# Patient Record
Sex: FEMALE | Race: WHITE | NOT HISPANIC OR LATINO | Employment: OTHER | URBAN - METROPOLITAN AREA
[De-identification: names, ages, dates, MRNs, and addresses within clinical notes are randomized per-mention and may not be internally consistent; named-entity substitution may affect disease eponyms.]

---

## 2023-03-09 ENCOUNTER — HOSPITAL ENCOUNTER (EMERGENCY)
Facility: HOSPITAL | Age: 88
Discharge: HOME | End: 2023-03-10
Attending: EMERGENCY MEDICINE

## 2023-03-09 DIAGNOSIS — N39.0 UTI (URINARY TRACT INFECTION): Primary | ICD-10-CM

## 2023-03-09 LAB
ALBUMIN SERPL BCP-MCNC: 3.6 G/DL (ref 3.5–5)
ALP SERPL-CCNC: 152 U/L (ref 34–104)
ALT SERPL W P-5'-P-CCNC: 6 U/L (ref 7–52)
ANION GAP SERPL CALCULATED.3IONS-SCNC: 15 MMOL/L (ref 4–13)
AST SERPL W P-5'-P-CCNC: 15 U/L (ref 13–39)
BACTERIA UR QL AUTO: ABNORMAL /HPF
BASOPHILS # BLD AUTO: 0.06 THOUSANDS/ÂΜL (ref 0–0.1)
BASOPHILS NFR BLD AUTO: 1 % (ref 0–1)
BILIRUB SERPL-MCNC: 0.92 MG/DL (ref 0.2–1)
BILIRUB UR QL STRIP: NEGATIVE
BUN SERPL-MCNC: 8 MG/DL (ref 5–25)
CALCIUM SERPL-MCNC: 8.7 MG/DL (ref 8.4–10.2)
CHLORIDE SERPL-SCNC: 102 MMOL/L (ref 96–108)
CLARITY UR: ABNORMAL
CO2 SERPL-SCNC: 19 MMOL/L (ref 21–32)
COLOR UR: ABNORMAL
CREAT SERPL-MCNC: 0.37 MG/DL (ref 0.6–1.3)
EOSINOPHIL # BLD AUTO: 0.04 THOUSAND/ÂΜL (ref 0–0.61)
EOSINOPHIL NFR BLD AUTO: 0 % (ref 0–6)
ERYTHROCYTE [DISTWIDTH] IN BLOOD BY AUTOMATED COUNT: 14.4 % (ref 11.6–15.1)
GFR SERPL CREATININE-BSD FRML MDRD: 88 ML/MIN/1.73SQ M
GLUCOSE SERPL-MCNC: 120 MG/DL (ref 65–140)
GLUCOSE SERPL-MCNC: 140 MG/DL (ref 65–140)
GLUCOSE UR STRIP-MCNC: NEGATIVE MG/DL
HCT VFR BLD AUTO: 39.4 % (ref 34.8–46.1)
HGB BLD-MCNC: 12.6 G/DL (ref 11.5–15.4)
HGB UR QL STRIP.AUTO: NEGATIVE
IMM GRANULOCYTES # BLD AUTO: 0.06 THOUSAND/UL (ref 0–0.2)
IMM GRANULOCYTES NFR BLD AUTO: 1 % (ref 0–2)
KETONES UR STRIP-MCNC: ABNORMAL MG/DL
LEUKOCYTE ESTERASE UR QL STRIP: ABNORMAL
LYMPHOCYTES # BLD AUTO: 0.97 THOUSANDS/ÂΜL (ref 0.6–4.47)
LYMPHOCYTES NFR BLD AUTO: 8 % (ref 14–44)
MCH RBC QN AUTO: 27.1 PG (ref 26.8–34.3)
MCHC RBC AUTO-ENTMCNC: 32 G/DL (ref 31.4–37.4)
MCV RBC AUTO: 85 FL (ref 82–98)
MONOCYTES # BLD AUTO: 0.7 THOUSAND/ÂΜL (ref 0.17–1.22)
MONOCYTES NFR BLD AUTO: 6 % (ref 4–12)
NEUTROPHILS # BLD AUTO: 10.32 THOUSANDS/ÂΜL (ref 1.85–7.62)
NEUTS SEG NFR BLD AUTO: 84 % (ref 43–75)
NITRITE UR QL STRIP: NEGATIVE
NON-SQ EPI CELLS URNS QL MICRO: ABNORMAL /HPF
NRBC BLD AUTO-RTO: 0 /100 WBCS
PH UR STRIP.AUTO: 7 [PH]
PLATELET # BLD AUTO: 571 THOUSANDS/UL (ref 149–390)
PMV BLD AUTO: 9.8 FL (ref 8.9–12.7)
POTASSIUM SERPL-SCNC: 3.3 MMOL/L (ref 3.5–5.3)
PROT SERPL-MCNC: 7.4 G/DL (ref 6.4–8.4)
PROT UR STRIP-MCNC: ABNORMAL MG/DL
RBC # BLD AUTO: 4.65 MILLION/UL (ref 3.81–5.12)
RBC #/AREA URNS AUTO: ABNORMAL /HPF
SODIUM SERPL-SCNC: 136 MMOL/L (ref 135–147)
SP GR UR STRIP.AUTO: 1.01 (ref 1–1.03)
UROBILINOGEN UR STRIP-ACNC: <2 MG/DL
WBC # BLD AUTO: 12.15 THOUSAND/UL (ref 4.31–10.16)
WBC #/AREA URNS AUTO: ABNORMAL /HPF

## 2023-03-09 RX ORDER — PROPYLTHIOURACIL 50 MG/1
50 TABLET ORAL 3 TIMES DAILY
COMMUNITY

## 2023-03-09 RX ORDER — QUINAPRIL 40 MG/1
40 TABLET ORAL
COMMUNITY

## 2023-03-09 RX ORDER — POTASSIUM CHLORIDE 20 MEQ/1
20 TABLET, EXTENDED RELEASE ORAL ONCE
Status: COMPLETED | OUTPATIENT
Start: 2023-03-09 | End: 2023-03-09

## 2023-03-09 RX ORDER — ATENOLOL 25 MG/1
25 TABLET ORAL DAILY
COMMUNITY

## 2023-03-09 RX ORDER — DIPYRIDAMOLE 25 MG
25 TABLET ORAL 4 TIMES DAILY
COMMUNITY

## 2023-03-09 RX ORDER — ATENOLOL 25 MG/1
25 TABLET ORAL DAILY
Status: COMPLETED | OUTPATIENT
Start: 2023-03-09 | End: 2023-03-09

## 2023-03-09 RX ORDER — OMEPRAZOLE 20 MG/1
20 CAPSULE, DELAYED RELEASE ORAL DAILY
COMMUNITY

## 2023-03-09 RX ORDER — CEPHALEXIN 250 MG/1
500 CAPSULE ORAL ONCE
Status: COMPLETED | OUTPATIENT
Start: 2023-03-09 | End: 2023-03-09

## 2023-03-09 RX ORDER — ATENOLOL 25 MG/1
25 TABLET ORAL DAILY
Status: DISCONTINUED | OUTPATIENT
Start: 2023-03-10 | End: 2023-03-09

## 2023-03-09 RX ADMIN — ATENOLOL 25 MG: 25 TABLET ORAL at 23:10

## 2023-03-09 RX ADMIN — POTASSIUM CHLORIDE 20 MEQ: 1500 TABLET, EXTENDED RELEASE ORAL at 23:10

## 2023-03-09 RX ADMIN — CEPHALEXIN 500 MG: 250 CAPSULE ORAL at 23:10

## 2023-03-10 VITALS
OXYGEN SATURATION: 96 % | RESPIRATION RATE: 12 BRPM | HEART RATE: 124 BPM | SYSTOLIC BLOOD PRESSURE: 130 MMHG | TEMPERATURE: 97.8 F | DIASTOLIC BLOOD PRESSURE: 82 MMHG

## 2023-03-10 LAB
ATRIAL RATE: 112 BPM
ATRIAL RATE: 141 BPM
P AXIS: 101 DEGREES
PR INTERVAL: 136 MS
QRS AXIS: -50 DEGREES
QRS AXIS: -51 DEGREES
QRSD INTERVAL: 72 MS
QRSD INTERVAL: 74 MS
QT INTERVAL: 306 MS
QT INTERVAL: 336 MS
QTC INTERVAL: 458 MS
QTC INTERVAL: 485 MS
T WAVE AXIS: -61 DEGREES
T WAVE AXIS: 68 DEGREES
VENTRICULAR RATE: 112 BPM
VENTRICULAR RATE: 151 BPM

## 2023-03-10 RX ORDER — CEPHALEXIN 500 MG/1
500 CAPSULE ORAL EVERY 8 HOURS SCHEDULED
Qty: 15 CAPSULE | Refills: 0 | Status: SHIPPED | OUTPATIENT
Start: 2023-03-10 | End: 2023-03-15

## 2023-03-10 NOTE — ED NOTES
Unable to get much information from patient  She has not gone to a hospital in about 35 years  According to daily medications it appears that she does have a cardiac history  Granddaughter is not able to give much information regarding patient, states that mother was the one helping patient, but since has been placed into a nursing facility  Patient lives at home alone and does everything on her own        Thomas Campbell RN  03/09/23 2019

## 2023-03-12 LAB
BACTERIA UR CULT: ABNORMAL

## 2023-03-12 NOTE — RESULT ENCOUNTER NOTE
Aerococcus >100 000 cfu - susceptible to cephalexin - treatment approrpriate  Ecoli [id number] treatment not indicated

## 2023-03-19 NOTE — ED PROVIDER NOTES
History  Chief Complaint   Patient presents with   • Possible UTI     Frequent urination starting today  Has noticed increased swelling in legs  Granddaughter states that she has not been drinking much today  5year-old female presenting to the emergency department for evaluation of urinary frequency  Patient notes that she has had frequency since earlier this morning, also noted some swelling to her bilateral legs, denies any shortness of breath  Denies any fever chills or flank pain  Denies abdominal pain nausea or vomiting  Prior to Admission Medications   Prescriptions Last Dose Informant Patient Reported? Taking?   atenolol (TENORMIN) 25 mg tablet   Yes Yes   Sig: Take 25 mg by mouth daily   dipyridamole (PERSANTINE) 25 mg tablet   Yes Yes   Sig: Take 25 mg by mouth 4 (four) times a day   omeprazole (PriLOSEC) 20 mg delayed release capsule   Yes Yes   Sig: Take 20 mg by mouth daily   propylthiouracil 50 mg tablet   Yes Yes   Sig: Take 50 mg by mouth 3 (three) times a day   quinapril (ACCUPRIL) 40 MG tablet   Yes Yes   Sig: Take 40 mg by mouth daily at bedtime      Facility-Administered Medications: None       Past Medical History:   Diagnosis Date   • Atrial fibrillation (HCC)    • GERD (gastroesophageal reflux disease)    • Heart attack [facility])     unknown when this happened   • Hypertension        History reviewed  No pertinent surgical history  History reviewed  No pertinent family history  I have reviewed and agree with the history as documented  E-Cigarette/Vaping   • E-Cigarette Use Never User      E-Cigarette/Vaping Substances     Social History     Tobacco Use   • Smoking status: Never   • Smokeless tobacco: Never   Vaping Use   • Vaping Use: Never used   Substance Use Topics   • Alcohol use: Never   • Drug use: Never       Review of Systems   Constitutional: Negative for appetite change, chills, fatigue and fever  HENT: Negative for sneezing and sore throat      Eyes: Negative for visual disturbance  Respiratory: Negative for cough, choking, chest tightness, shortness of breath and wheezing  Cardiovascular: Negative for chest pain and palpitations  Gastrointestinal: Negative for abdominal pain, constipation, diarrhea, nausea and vomiting  Genitourinary: Positive for frequency  Negative for difficulty urinating and dysuria  Neurological: Negative for dizziness, weakness, light-headedness, numbness and headaches  All other systems reviewed and are negative  Physical Exam  Physical Exam  Vitals reviewed  Constitutional:       General: She is not in acute distress  Appearance: She is well-developed  She is not diaphoretic  HENT:      Head: Normocephalic and atraumatic  Eyes:      Pupils: Pupils are equal, round, and reactive to light  Neck:      Vascular: No JVD  Trachea: No tracheal deviation  Cardiovascular:      Rate and Rhythm: Normal rate and regular rhythm  Heart sounds: Normal heart sounds  No murmur heard  No friction rub  No gallop  Pulmonary:      Effort: Pulmonary effort is normal  No respiratory distress  Breath sounds: Normal breath sounds  No wheezing or rales  Abdominal:      General: Bowel sounds are normal  There is no distension  Palpations: Abdomen is soft  Tenderness: There is no abdominal tenderness  There is no guarding or rebound  Skin:     General: Skin is warm and dry  Coloration: Skin is not pale  Neurological:      Mental Status: She is alert and oriented to person, place, and time  Cranial Nerves: No cranial nerve deficit  Motor: No abnormal muscle tone     Psychiatric:         Behavior: Behavior normal          Vital Signs  ED Triage Vitals [03/09/23 2010]   Temperature Pulse Respirations Blood Pressure SpO2   97 8 °F (36 6 °C) 98 16 (!) 206/96 96 %      Temp Source Heart Rate Source Patient Position - Orthostatic VS BP Location FiO2 (%)   Oral Monitor Sitting Right arm --      Pain Score       --           Vitals:    03/09/23 2300 03/10/23 0000 03/10/23 0030 03/10/23 0032   BP: 155/88 141/99 130/82    Pulse: (!) 158 (!) 135 (!) 125 (!) 124   Patient Position - Orthostatic VS:             Visual Acuity      ED Medications  Medications   cephalexin (KEFLEX) capsule 500 mg (500 mg Oral Given 3/9/23 2310)   potassium chloride (K-DUR,KLOR-CON) CR tablet 20 mEq (20 mEq Oral Given 3/9/23 2310)   atenolol (TENORMIN) tablet 25 mg (25 mg Oral Given 3/9/23 2310)       Diagnostic Studies  Results Reviewed     Procedure Component Value Units Date/Time    Urine culture [[id number]]  (Abnormal)  (Susceptibility) Collected: 03/09/23 2037    Lab Status: Final result Specimen: Urine, Other Updated: 03/12/23 0841     Urine Culture >100,000 cfu/ml Aerococcus urinae      10,000-19,000 cfu/ml Escherichia coli      <10,000 cfu/ml    Susceptibility     Aerococcus urinae (1)     Antibiotic Interpretation Microscan   Method Status    ZID Performed  Yes  DAHLIA Final          Escherichia coli (2)     Antibiotic Interpretation Microscan   Method Status    ZID Performed  Yes  DAHLIA Final    Amoxicillin + Clavulanate Resistant >16/8 ug/ml DAHLIA Final    Ampicillin ($$) Resistant >16 00 ug/ml DAHLIA Final    Ampicillin + Sulbactam ($) Intermediate 16/8 ug/ml DAHLIA Final    Aztreonam ($$$)  Susceptible <=4 ug/ml DAHLIA Final    Cefazolin ($) Resistant 16 00 ug/ml DAHLIA Final    Cefuroxime ($$) Susceptible 8 ug/ml DAHLIA Final    Ciprofloxacin ($)  Susceptible <=0 25 ug/ml DAHLIA Final    Ertapenem ($$$) Susceptible <=0 5 ug/ml DAHLIA Final    Gentamicin ($$) Susceptible <=2 ug/ml DAHLIA Final    Levofloxacin ($) Susceptible <=0 50 ug/ml DAHLIA Final    Nitrofurantoin Susceptible <=32 ug/ml DAHLIA Final    Piperacillin + Tazobactam ($$$) Susceptible <=8 ug/ml DAHLIA Final    Tetracycline Susceptible <=4 ug/ml DAHLIA Final    Tobramycin ($) Susceptible <=2 ug/ml DAHLIA Final    Trimethoprim + Sulfamethoxazole ($$$) Susceptible <=0 5/9 5 ug/ml DAHLIA Final Comprehensive metabolic panel [[id number]]  (Abnormal) Collected: 03/09/23 2155    Lab Status: Final result Specimen: Blood from Hand, Right Updated: 03/09/23 2232     Sodium 136 mmol/L      Potassium 3 3 mmol/L      Chloride 102 mmol/L      CO2 19 mmol/L      ANION GAP 15 mmol/L      BUN 8 mg/dL      Creatinine 0 37 mg/dL      Glucose 140 mg/dL      Calcium 8 7 mg/dL      AST 15 U/L      ALT 6 U/L      Alkaline Phosphatase 152 U/L      Total Protein 7 4 g/dL      Albumin 3 6 g/dL      Total Bilirubin 0 92 mg/dL      eGFR 88 ml/min/1 73sq m     Narrative:      [place] guidelines for Chronic Kidney Disease (CKD):   •  Stage 1 with normal or high GFR (GFR > 90 mL/min/1 73 square meters)  •  Stage 2 Mild CKD (GFR = 60-89 mL/min/1 73 square meters)  •  Stage 3A Moderate CKD (GFR = 45-59 mL/min/1 73 square meters)  •  Stage 3B Moderate CKD (GFR = 30-44 mL/min/1 73 square meters)  •  Stage 4 Severe CKD (GFR = 15-29 mL/min/1 73 square meters)  •  Stage 5 End Stage CKD (GFR <15 mL/min/1 73 square meters)  Note: GFR calculation is accurate only with a steady state creatinine    Urine Microscopic [[id number]]  (Abnormal) Collected: 03/09/23 2037    Lab Status: Final result Specimen: Urine, Other Updated: 03/09/23 2130     RBC, UA 2-4 /hpf      WBC, UA 30-50 /hpf      Epithelial Cells Occasional /hpf      Bacteria, UA Occasional /hpf     UA w Reflex to Microscopic w Reflex to Culture [[id number]]  (Abnormal) Collected: 03/09/23 2037    Lab Status: Final result Specimen: Urine, Other Updated: 03/09/23 2105     Color, UA Light Yellow     Clarity, UA Turbid     Specific Gravity, UA 1 006     pH, UA 7 0     Leukocytes, UA Small     Nitrite, UA Negative     Protein,  (2+) mg/dl      Glucose, UA Negative mg/dl      Ketones, UA 10 (1+) mg/dl      Urobilinogen, UA <2 0 mg/dl      Bilirubin, UA Negative     Occult Blood, UA Negative    CBC and differential [[id number]]  (Abnormal) Collected: 03/09/23 2038 Lab Status: Final result Specimen: Blood from Hand, Right Updated: 03/09/23 2046     WBC 12 15 Thousand/uL      RBC 4 65 Million/uL      Hemoglobin 12 6 g/dL      Hematocrit 39 4 %      MCV 85 fL      MCH 27 1 pg      MCHC 32 0 g/dL      RDW 14 4 %      MPV 9 8 fL      Platelets 014 Thousands/uL      nRBC 0 /100 WBCs      Neutrophils Relative 84 %      Immat GRANS % 1 %      Lymphocytes Relative 8 %      Monocytes Relative 6 %      Eosinophils Relative 0 %      Basophils Relative 1 %      Neutrophils Absolute 10 32 Thousands/µL      Immature Grans Absolute 0 06 Thousand/uL      Lymphocytes Absolute 0 97 Thousands/µL      Monocytes Absolute 0 70 Thousand/µL      Eosinophils Absolute 0 04 Thousand/µL      Basophils Absolute 0 06 Thousands/µL     Fingerstick Glucose (POCT) [[id number]]  (Normal) Collected: 03/09/23 2014    Lab Status: Final result Updated: 03/09/23 2015     POC Glucose 120 mg/dl                  No orders to display              Procedures  Procedures         ED Course                                             Medical Decision Making  66-year-old female with concern for urinary tract infection, has some leg edema here but no shortness of breath  Patient states this is about her baseline  No signs or symptoms of obstructive uropathy here, will check urinalysis, labs, reassess  UA does have some bacteria in it, with her symptoms we will treat for urinary tract infection, blood work overall okay, likely candidate for trial of outpatient management is overall well-appearing, recommend increase p o  intake  She is tolerating p o  here without difficulty, reviewed return precautions  Amount and/or Complexity of Data Reviewed  Labs: ordered  Risk  Prescription drug management            Disposition  Final diagnoses:   UTI (urinary tract infection)     Time reflects when diagnosis was documented in both MDM as applicable and the Disposition within this note     Time User Action Codes Description Comment    3/10/2023 12:33 AM Gracy Poon Add [N39 0] UTI (urinary tract infection)       ED Disposition     ED Disposition   Discharge    Condition   Stable    Date/Time   Fri Mar 10, 2023 12:33 AM    Comment   Ciera Esquivel discharge to home/self care  Follow-up Information     Follow up With Specialties Details Why Contact Info    Jackelyn Kelley DO General Practice   [address]  [address]  [address] [address] [phone]            Discharge Medication List as of 3/10/2023 12:34 AM      START taking these medications    Details   cephalexin (KEFLEX) 500 mg capsule Take 1 capsule (500 mg total) by mouth every 8 (eight) hours for 5 days, Starting Fri 3/10/2023, Until Wed 3/15/2023, Normal         CONTINUE these medications which have NOT CHANGED    Details   atenolol (TENORMIN) 25 mg tablet Take 25 mg by mouth daily, Historical Med      dipyridamole (PERSANTINE) 25 mg tablet Take 25 mg by mouth 4 (four) times a day, Historical Med      omeprazole (PriLOSEC) 20 mg delayed release capsule Take 20 mg by mouth daily, Historical Med      propylthiouracil 50 mg tablet Take 50 mg by mouth 3 (three) times a day, Historical Med      quinapril (ACCUPRIL) 40 MG tablet Take 40 mg by mouth daily at bedtime, Historical Med             No discharge procedures on file      PDMP Review     None          ED Provider  Electronically Signed by           Barby Quinn MD  03/19/23 0973

## 2023-07-10 ENCOUNTER — APPOINTMENT (EMERGENCY)
Dept: RADIOLOGY | Facility: HOSPITAL | Age: 88
DRG: 186 | End: 2023-07-10
Payer: MEDICARE

## 2023-07-10 ENCOUNTER — HOSPITAL ENCOUNTER (INPATIENT)
Facility: HOSPITAL | Age: 88
LOS: 4 days | Discharge: NURSING, REHAB OR LONG-TERM CARE | DRG: 186 | End: 2023-07-14
Attending: EMERGENCY MEDICINE | Admitting: INTERNAL MEDICINE
Payer: MEDICARE

## 2023-07-10 DIAGNOSIS — R53.1 GENERALIZED WEAKNESS: ICD-10-CM

## 2023-07-10 DIAGNOSIS — J90 PLEURAL EFFUSION: ICD-10-CM

## 2023-07-10 DIAGNOSIS — I50.9 CHF (CONGESTIVE HEART FAILURE) (HCC): ICD-10-CM

## 2023-07-10 DIAGNOSIS — N30.00 ACUTE CYSTITIS WITHOUT HEMATURIA: ICD-10-CM

## 2023-07-10 DIAGNOSIS — N39.0 UTI (URINARY TRACT INFECTION): Primary | ICD-10-CM

## 2023-07-10 DIAGNOSIS — I48.0 PAROXYSMAL ATRIAL FIBRILLATION (HCC): ICD-10-CM

## 2023-07-10 PROBLEM — R26.2 AMBULATORY DYSFUNCTION: Status: ACTIVE | Noted: 2023-07-10

## 2023-07-10 PROBLEM — I10 ESSENTIAL HYPERTENSION: Status: ACTIVE | Noted: 2023-07-10

## 2023-07-10 PROBLEM — J96.01 ACUTE RESPIRATORY FAILURE WITH HYPOXIA (HCC): Status: ACTIVE | Noted: 2023-07-10

## 2023-07-10 PROBLEM — A41.9 SEPSIS (HCC): Status: ACTIVE | Noted: 2023-07-10

## 2023-07-10 LAB
2HR DELTA HS TROPONIN: 19 NG/L
ALBUMIN SERPL BCP-MCNC: 3.1 G/DL (ref 3.5–5)
ALP SERPL-CCNC: 129 U/L (ref 34–104)
ALT SERPL W P-5'-P-CCNC: 9 U/L (ref 7–52)
ANION GAP SERPL CALCULATED.3IONS-SCNC: 10 MMOL/L
AST SERPL W P-5'-P-CCNC: 12 U/L (ref 13–39)
BACTERIA UR QL AUTO: ABNORMAL /HPF
BASOPHILS # BLD AUTO: 0.02 THOUSANDS/ÂΜL (ref 0–0.1)
BASOPHILS NFR BLD AUTO: 0 % (ref 0–1)
BILIRUB SERPL-MCNC: 0.98 MG/DL (ref 0.2–1)
BILIRUB UR QL STRIP: NEGATIVE
BNP SERPL-MCNC: 889 PG/ML (ref 0–100)
BUDDING YEAST: PRESENT
BUN SERPL-MCNC: 17 MG/DL (ref 5–25)
CALCIUM ALBUM COR SERPL-MCNC: 8.7 MG/DL (ref 8.3–10.1)
CALCIUM SERPL-MCNC: 8 MG/DL (ref 8.4–10.2)
CARDIAC TROPONIN I PNL SERPL HS: 52 NG/L
CARDIAC TROPONIN I PNL SERPL HS: 71 NG/L
CHLORIDE SERPL-SCNC: 109 MMOL/L (ref 96–108)
CK SERPL-CCNC: 29 U/L (ref 26–192)
CLARITY UR: ABNORMAL
CO2 SERPL-SCNC: 24 MMOL/L (ref 21–32)
COLOR UR: YELLOW
CREAT SERPL-MCNC: 0.66 MG/DL (ref 0.6–1.3)
EOSINOPHIL # BLD AUTO: 0.04 THOUSAND/ÂΜL (ref 0–0.61)
EOSINOPHIL NFR BLD AUTO: 0 % (ref 0–6)
ERYTHROCYTE [DISTWIDTH] IN BLOOD BY AUTOMATED COUNT: 15.6 % (ref 11.6–15.1)
FLUAV RNA RESP QL NAA+PROBE: NEGATIVE
FLUBV RNA RESP QL NAA+PROBE: NEGATIVE
GFR SERPL CREATININE-BSD FRML MDRD: 73 ML/MIN/1.73SQ M
GLUCOSE SERPL-MCNC: 108 MG/DL (ref 65–140)
GLUCOSE SERPL-MCNC: 110 MG/DL (ref 65–140)
GLUCOSE UR STRIP-MCNC: NEGATIVE MG/DL
HCT VFR BLD AUTO: 38.8 % (ref 34.8–46.1)
HGB BLD-MCNC: 12.5 G/DL (ref 11.5–15.4)
HGB UR QL STRIP.AUTO: ABNORMAL
IMM GRANULOCYTES # BLD AUTO: 0.07 THOUSAND/UL (ref 0–0.2)
IMM GRANULOCYTES NFR BLD AUTO: 1 % (ref 0–2)
KETONES UR STRIP-MCNC: NEGATIVE MG/DL
LACTATE SERPL-SCNC: 1.3 MMOL/L (ref 0.5–2)
LEUKOCYTE ESTERASE UR QL STRIP: ABNORMAL
LYMPHOCYTES # BLD AUTO: 0.66 THOUSANDS/ÂΜL (ref 0.6–4.47)
LYMPHOCYTES NFR BLD AUTO: 6 % (ref 14–44)
MAGNESIUM SERPL-MCNC: 1.7 MG/DL (ref 1.9–2.7)
MCH RBC QN AUTO: 28.1 PG (ref 26.8–34.3)
MCHC RBC AUTO-ENTMCNC: 32.2 G/DL (ref 31.4–37.4)
MCV RBC AUTO: 87 FL (ref 82–98)
MONOCYTES # BLD AUTO: 0.55 THOUSAND/ÂΜL (ref 0.17–1.22)
MONOCYTES NFR BLD AUTO: 5 % (ref 4–12)
MUCOUS THREADS UR QL AUTO: ABNORMAL
NEUTROPHILS # BLD AUTO: 10.34 THOUSANDS/ÂΜL (ref 1.85–7.62)
NEUTS SEG NFR BLD AUTO: 88 % (ref 43–75)
NITRITE UR QL STRIP: NEGATIVE
NON-SQ EPI CELLS URNS QL MICRO: ABNORMAL /HPF
NRBC BLD AUTO-RTO: 0 /100 WBCS
PH UR STRIP.AUTO: 6 [PH]
PLATELET # BLD AUTO: 451 THOUSANDS/UL (ref 149–390)
PMV BLD AUTO: 9.8 FL (ref 8.9–12.7)
POTASSIUM SERPL-SCNC: 2.8 MMOL/L (ref 3.5–5.3)
POTASSIUM SERPL-SCNC: 3.4 MMOL/L (ref 3.5–5.3)
PROT SERPL-MCNC: 7 G/DL (ref 6.4–8.4)
PROT UR STRIP-MCNC: ABNORMAL MG/DL
RBC # BLD AUTO: 4.45 MILLION/UL (ref 3.81–5.12)
RBC #/AREA URNS AUTO: ABNORMAL /HPF
RSV RNA RESP QL NAA+PROBE: NEGATIVE
SARS-COV-2 RNA RESP QL NAA+PROBE: NEGATIVE
SODIUM SERPL-SCNC: 143 MMOL/L (ref 135–147)
SP GR UR STRIP.AUTO: 1.02 (ref 1–1.03)
TSH SERPL DL<=0.05 MIU/L-ACNC: 4.12 UIU/ML (ref 0.45–4.5)
UROBILINOGEN UR STRIP-ACNC: 3 MG/DL
WBC # BLD AUTO: 11.68 THOUSAND/UL (ref 4.31–10.16)
WBC #/AREA URNS AUTO: ABNORMAL /HPF
WBC CLUMPS # UR AUTO: PRESENT /UL

## 2023-07-10 PROCEDURE — 99285 EMERGENCY DEPT VISIT HI MDM: CPT | Performed by: PHYSICIAN ASSISTANT

## 2023-07-10 PROCEDURE — 99223 1ST HOSP IP/OBS HIGH 75: CPT | Performed by: INTERNAL MEDICINE

## 2023-07-10 PROCEDURE — 84132 ASSAY OF SERUM POTASSIUM: CPT | Performed by: INTERNAL MEDICINE

## 2023-07-10 PROCEDURE — 82550 ASSAY OF CK (CPK): CPT | Performed by: PHYSICIAN ASSISTANT

## 2023-07-10 PROCEDURE — 85025 COMPLETE CBC W/AUTO DIFF WBC: CPT | Performed by: PHYSICIAN ASSISTANT

## 2023-07-10 PROCEDURE — 81001 URINALYSIS AUTO W/SCOPE: CPT | Performed by: PHYSICIAN ASSISTANT

## 2023-07-10 PROCEDURE — 83880 ASSAY OF NATRIURETIC PEPTIDE: CPT | Performed by: PHYSICIAN ASSISTANT

## 2023-07-10 PROCEDURE — 71045 X-RAY EXAM CHEST 1 VIEW: CPT

## 2023-07-10 PROCEDURE — 80053 COMPREHEN METABOLIC PANEL: CPT | Performed by: PHYSICIAN ASSISTANT

## 2023-07-10 PROCEDURE — 96366 THER/PROPH/DIAG IV INF ADDON: CPT

## 2023-07-10 PROCEDURE — 99497 ADVNCD CARE PLAN 30 MIN: CPT | Performed by: INTERNAL MEDICINE

## 2023-07-10 PROCEDURE — 0241U HB NFCT DS VIR RESP RNA 4 TRGT: CPT | Performed by: PHYSICIAN ASSISTANT

## 2023-07-10 PROCEDURE — 87040 BLOOD CULTURE FOR BACTERIA: CPT | Performed by: PHYSICIAN ASSISTANT

## 2023-07-10 PROCEDURE — 96368 THER/DIAG CONCURRENT INF: CPT

## 2023-07-10 PROCEDURE — 83605 ASSAY OF LACTIC ACID: CPT | Performed by: PHYSICIAN ASSISTANT

## 2023-07-10 PROCEDURE — 84443 ASSAY THYROID STIM HORMONE: CPT | Performed by: INTERNAL MEDICINE

## 2023-07-10 PROCEDURE — 87086 URINE CULTURE/COLONY COUNT: CPT | Performed by: PHYSICIAN ASSISTANT

## 2023-07-10 PROCEDURE — 87186 SC STD MICRODIL/AGAR DIL: CPT | Performed by: PHYSICIAN ASSISTANT

## 2023-07-10 PROCEDURE — 93005 ELECTROCARDIOGRAM TRACING: CPT

## 2023-07-10 PROCEDURE — 96375 TX/PRO/DX INJ NEW DRUG ADDON: CPT

## 2023-07-10 PROCEDURE — 96361 HYDRATE IV INFUSION ADD-ON: CPT

## 2023-07-10 PROCEDURE — 82948 REAGENT STRIP/BLOOD GLUCOSE: CPT

## 2023-07-10 PROCEDURE — 84484 ASSAY OF TROPONIN QUANT: CPT | Performed by: PHYSICIAN ASSISTANT

## 2023-07-10 PROCEDURE — 99284 EMERGENCY DEPT VISIT MOD MDM: CPT

## 2023-07-10 PROCEDURE — 36415 COLL VENOUS BLD VENIPUNCTURE: CPT | Performed by: PHYSICIAN ASSISTANT

## 2023-07-10 PROCEDURE — 83735 ASSAY OF MAGNESIUM: CPT | Performed by: PHYSICIAN ASSISTANT

## 2023-07-10 PROCEDURE — 87077 CULTURE AEROBIC IDENTIFY: CPT | Performed by: PHYSICIAN ASSISTANT

## 2023-07-10 PROCEDURE — 96365 THER/PROPH/DIAG IV INF INIT: CPT

## 2023-07-10 RX ORDER — HYDRALAZINE HYDROCHLORIDE 20 MG/ML
10 INJECTION INTRAMUSCULAR; INTRAVENOUS EVERY 6 HOURS PRN
Status: DISCONTINUED | OUTPATIENT
Start: 2023-07-10 | End: 2023-07-14 | Stop reason: HOSPADM

## 2023-07-10 RX ORDER — PANTOPRAZOLE SODIUM 40 MG/1
40 TABLET, DELAYED RELEASE ORAL
Status: DISCONTINUED | OUTPATIENT
Start: 2023-07-11 | End: 2023-07-14 | Stop reason: HOSPADM

## 2023-07-10 RX ORDER — ATENOLOL 25 MG/1
25 TABLET ORAL DAILY
Status: DISCONTINUED | OUTPATIENT
Start: 2023-07-10 | End: 2023-07-10

## 2023-07-10 RX ORDER — LABETALOL HYDROCHLORIDE 5 MG/ML
10 INJECTION, SOLUTION INTRAVENOUS EVERY 6 HOURS PRN
Status: DISCONTINUED | OUTPATIENT
Start: 2023-07-10 | End: 2023-07-14 | Stop reason: HOSPADM

## 2023-07-10 RX ORDER — LISINOPRIL 20 MG/1
40 TABLET ORAL DAILY
Status: DISCONTINUED | OUTPATIENT
Start: 2023-07-11 | End: 2023-07-11

## 2023-07-10 RX ORDER — FUROSEMIDE 10 MG/ML
40 INJECTION INTRAMUSCULAR; INTRAVENOUS
Status: DISCONTINUED | OUTPATIENT
Start: 2023-07-11 | End: 2023-07-14 | Stop reason: HOSPADM

## 2023-07-10 RX ORDER — DILTIAZEM HYDROCHLORIDE 5 MG/ML
0.25 INJECTION INTRAVENOUS ONCE
Status: COMPLETED | OUTPATIENT
Start: 2023-07-10 | End: 2023-07-10

## 2023-07-10 RX ORDER — CEFTRIAXONE 1 G/50ML
1000 INJECTION, SOLUTION INTRAVENOUS ONCE
Status: COMPLETED | OUTPATIENT
Start: 2023-07-10 | End: 2023-07-10

## 2023-07-10 RX ORDER — LISINOPRIL 20 MG/1
40 TABLET ORAL ONCE
Status: COMPLETED | OUTPATIENT
Start: 2023-07-10 | End: 2023-07-10

## 2023-07-10 RX ORDER — ATENOLOL 25 MG/1
25 TABLET ORAL DAILY
Status: DISCONTINUED | OUTPATIENT
Start: 2023-07-11 | End: 2023-07-12

## 2023-07-10 RX ORDER — PROPYLTHIOURACIL 50 MG/1
50 TABLET ORAL 3 TIMES DAILY
Status: DISCONTINUED | OUTPATIENT
Start: 2023-07-10 | End: 2023-07-14 | Stop reason: HOSPADM

## 2023-07-10 RX ORDER — ONDANSETRON 2 MG/ML
4 INJECTION INTRAMUSCULAR; INTRAVENOUS EVERY 6 HOURS PRN
Status: DISCONTINUED | OUTPATIENT
Start: 2023-07-10 | End: 2023-07-14 | Stop reason: HOSPADM

## 2023-07-10 RX ORDER — MAGNESIUM SULFATE HEPTAHYDRATE 40 MG/ML
2 INJECTION, SOLUTION INTRAVENOUS ONCE
Status: COMPLETED | OUTPATIENT
Start: 2023-07-10 | End: 2023-07-10

## 2023-07-10 RX ORDER — METOPROLOL TARTRATE 5 MG/5ML
5 INJECTION INTRAVENOUS
Status: DISCONTINUED | OUTPATIENT
Start: 2023-07-10 | End: 2023-07-14 | Stop reason: HOSPADM

## 2023-07-10 RX ORDER — POTASSIUM CHLORIDE 20 MEQ/1
40 TABLET, EXTENDED RELEASE ORAL ONCE
Status: COMPLETED | OUTPATIENT
Start: 2023-07-10 | End: 2023-07-10

## 2023-07-10 RX ORDER — POTASSIUM CHLORIDE 14.9 MG/ML
20 INJECTION INTRAVENOUS ONCE
Status: COMPLETED | OUTPATIENT
Start: 2023-07-10 | End: 2023-07-10

## 2023-07-10 RX ORDER — DIPYRIDAMOLE 25 MG
25 TABLET ORAL 4 TIMES DAILY
Status: DISCONTINUED | OUTPATIENT
Start: 2023-07-10 | End: 2023-07-12

## 2023-07-10 RX ORDER — FUROSEMIDE 40 MG/1
40 TABLET ORAL 2 TIMES DAILY
Status: DISCONTINUED | OUTPATIENT
Start: 2023-07-10 | End: 2023-07-10

## 2023-07-10 RX ORDER — ENOXAPARIN SODIUM 100 MG/ML
40 INJECTION SUBCUTANEOUS DAILY
Status: DISCONTINUED | OUTPATIENT
Start: 2023-07-11 | End: 2023-07-11

## 2023-07-10 RX ORDER — ACETAMINOPHEN 325 MG/1
650 TABLET ORAL EVERY 6 HOURS PRN
Status: DISCONTINUED | OUTPATIENT
Start: 2023-07-10 | End: 2023-07-14 | Stop reason: HOSPADM

## 2023-07-10 RX ORDER — POTASSIUM CHLORIDE 20 MEQ/1
40 TABLET, EXTENDED RELEASE ORAL
Status: COMPLETED | OUTPATIENT
Start: 2023-07-10 | End: 2023-07-10

## 2023-07-10 RX ORDER — FUROSEMIDE 10 MG/ML
40 INJECTION INTRAMUSCULAR; INTRAVENOUS ONCE
Status: COMPLETED | OUTPATIENT
Start: 2023-07-10 | End: 2023-07-10

## 2023-07-10 RX ORDER — CEFTRIAXONE 1 G/50ML
1000 INJECTION, SOLUTION INTRAVENOUS EVERY 24 HOURS
Status: DISCONTINUED | OUTPATIENT
Start: 2023-07-11 | End: 2023-07-14 | Stop reason: HOSPADM

## 2023-07-10 RX ADMIN — PROPYLTHIOURACIL 50 MG: 50 TABLET ORAL at 22:51

## 2023-07-10 RX ADMIN — FUROSEMIDE 40 MG: 10 INJECTION, SOLUTION INTRAVENOUS at 15:50

## 2023-07-10 RX ADMIN — POTASSIUM CHLORIDE 40 MEQ: 1500 TABLET, EXTENDED RELEASE ORAL at 21:45

## 2023-07-10 RX ADMIN — LISINOPRIL 40 MG: 20 TABLET ORAL at 15:51

## 2023-07-10 RX ADMIN — POTASSIUM CHLORIDE 40 MEQ: 1500 TABLET, EXTENDED RELEASE ORAL at 15:29

## 2023-07-10 RX ADMIN — DILTIAZEM HYDROCHLORIDE 12.5 MG: 5 INJECTION INTRAVENOUS at 16:27

## 2023-07-10 RX ADMIN — POTASSIUM CHLORIDE 40 MEQ: 1500 TABLET, EXTENDED RELEASE ORAL at 19:50

## 2023-07-10 RX ADMIN — POTASSIUM CHLORIDE 20 MEQ: 14.9 INJECTION, SOLUTION INTRAVENOUS at 15:21

## 2023-07-10 RX ADMIN — METOROPROLOL TARTRATE 5 MG: 5 INJECTION, SOLUTION INTRAVENOUS at 23:24

## 2023-07-10 RX ADMIN — CEFTRIAXONE 1000 MG: 1 INJECTION, SOLUTION INTRAVENOUS at 16:32

## 2023-07-10 RX ADMIN — MAGNESIUM SULFATE HEPTAHYDRATE 2 G: 40 INJECTION, SOLUTION INTRAVENOUS at 15:21

## 2023-07-10 RX ADMIN — POTASSIUM CHLORIDE 40 MEQ: 1500 TABLET, EXTENDED RELEASE ORAL at 23:15

## 2023-07-10 RX ADMIN — SODIUM CHLORIDE 1000 ML: 0.9 INJECTION, SOLUTION INTRAVENOUS at 14:42

## 2023-07-10 RX ADMIN — ATENOLOL 25 MG: 25 TABLET ORAL at 15:51

## 2023-07-10 NOTE — H&P
[address]  H&P  Name: Manuela Mccauley 80 y.o. female I MRN: [id number]  Unit/Bed#: -01 I Date of Admission: 7/10/2023   Date of Service: 7/10/2023 I Hospital Day: 0      Assessment/Plan   * Acute respiratory failure with hypoxia [facility])  Assessment & Plan  Presenting with worsening SOB and increasing O2 needs now needing 2L    At baseline, uses no supplemental O2    Plan:  • Treat underlying pleural effusion  • Wean O2 as able      Generalized weakness  Assessment & Plan  Generalized weakness and malaise for the past 2 days  May be attributed to UTI  UA showing innumerable WBCs and bacteria; received 1 dose of ceftriaxone in the ED    Continue antibiotic regimen  Follow urine culture and sensitivities      Ambulatory dysfunction  Assessment & Plan  • PT/OT Eval and treat  AM-PAC Basic Mobility:  Basic Mobility Inpatient Raw Score: 13    -[facility] Achieved: 3: Sit at edge of bed  -[facility] Goal: 4: Move to chair/commode  • Encourage appropriate mobility to achieve and improve upon [facility] Goals as noted    CM input appreciated - Patient lives at home alone; patient and family would like for patient to go to SNF as she would not be a safe discharge home - Preferred [facility] due to family member also there    Pleural effusion on right  Assessment & Plan  Patient presenting with symptoms of SOB and lethargic    · Rales present  · Imaging showed  · This is suspected to be secondary to  but cannot be definitively diagnosed without fluid studies    Plan:  · Lasix diuresis  · Maintain K > 4 and magnesium > 2  · Monitor on Tele  · Monitor I/O's  · Diet restriction fluid of 1800 mL per day   · Patient would benefit from diagnostic and therapeutic thoracentesis;  Deferring procedure to be done; opting for more conservative interention    Essential hypertension  Assessment & Plan  Blood Pressure: 112/76      Plan:  • Continue home meds  • Monitor blood pressure  • PRN BP Meds ordered for SBP > 160    Paroxysmal atrial fibrillation (HCC)  Assessment & Plan  Parox atrial fibrillation on cardizem and atenolol on no AC due to high risk of falls  HR 110s on admission, likely worsened by UTI and pleural effusion    Plan:  • Continue home meds  • Monitor rates/BP  • PRN Lopressor 5 mg IV q5 min for 5 doses for RVR       VTE Pharmacologic Prophylaxis: VTE Score: 6 High Risk (Score >/= 5) - Pharmacological DVT Prophylaxis Ordered: enoxaparin (Lovenox). Sequential Compression Devices Ordered. Code Status: Level 3 - DNAR and DNI   Discussion with Patient/Family: patient and granddaughter (POA)    Anticipated Length of Stay: Patient will be admitted on an inpatient basis with an anticipated length of stay of greater than 2 midnights secondary to plan noted. Total Time for Visit, including Counseling / Coordination of Care: 85 minutes Greater than 50% of this total time spent on direct patient counseling and coordination of care. Chief Complaint:   Chief Complaint   Patient presents with   • Failure To Thrive     Pt arrives with EMS from home, family reports decreased oral intake and ambulation. Pt family reports that family doctor saw her last week and these symptoms were not present. History of Present Illness:  Brody Lainez is a 80 y.o. female who presents with generalized weakness and malaise for the past 2 days    PMHx paroxysmal atrial fibrillation, HTN, GERD    Presented to the ED due to worsening weakness and also noted to have desaturation to mid 80s on room air. Chest x-ray was obtained at that time showing significant right-sided pleural effusion. Patient does not have a history of this in the past.    In the ED, patient reportedly appeared clinically dry and received a liter bolus of fluids. However after x-ray was obtained, patient received diuretic regimen.   Also noted to be hypokalemic and hypomagnesemic  Initially was offered the option for thoracentesis but patient and granddaughter declined, opting for more medical management and less invasive maneuvers for treatment. Per patient and family, patient's  underwent similar procedures in the past and they feel that pursuing such aggressive interventions would not improve quality of life. Of note, UA also appeared to have innumerable WBCs and Bacteria and received ceftriaxone in the ED. When asked about urine symptoms, patient's grand daughter reported patient had foul smelling urine over the past few days, dark in color. No fevers, nausea, chills, confusion noted. Patient and family preferring for patient to be discharged to facility, ideally [facility], once cleared for discharge. GOC was also discussed with patient and granddaughter. Admitted for treatment of UTI and diuresis as conservative management of pleural effusion    Review of Systems:  A 10-point review of systems was obtained. Pertinent positives and negatives are outlined in the HPI above. Remainder of review of systems are otherwise negative. Past Medical and Surgical History:   Past Medical History:   Diagnosis Date   • Atrial fibrillation ([address])    • GERD (gastroesophageal reflux disease)    • Heart attack [facility])     unknown when this happened   • Hypertension        History reviewed. No pertinent surgical history. Meds/Allergies:  Prior to Admission medications    Medication Sig Start Date End Date Taking?  Authorizing Provider   atenolol (TENORMIN) 25 mg tablet Take 25 mg by mouth daily    Historical Provider, MD   dipyridamole (PERSANTINE) 25 mg tablet Take 25 mg by mouth 4 (four) times a day    Historical Provider, MD   omeprazole (PriLOSEC) 20 mg delayed release capsule Take 20 mg by mouth daily    Historical Provider, MD   propylthiouracil 50 mg tablet Take 50 mg by mouth 3 (three) times a day    Historical Provider, MD   quinapril (ACCUPRIL) 40 MG tablet Take 40 mg by mouth daily at bedtime    Historical Provider, MD MORFIN have reviewed home medications with patient family member. Allergies: No Known Allergies    Social History:  Marital Status:    Patient Pre-hospital Living Situation: Home  Patient Pre-hospital Level of Mobility: walks with cane  Patient Pre-hospital Diet Restrictions: none  Substance Use History:   Social History     Substance and Sexual Activity   Alcohol Use Never     Social History     Tobacco Use   Smoking Status Never   Smokeless Tobacco Never     Social History     Substance and Sexual Activity   Drug Use Never       Family History:  History reviewed. No pertinent family history. Physical Exam:     Vitals:   Blood Pressure: 112/76 (07/10/23 1858)  Pulse: (!) 121 (07/10/23 1858)  Temperature: 98.6 °F (37 °C) (07/10/23 1858)  Temp Source: Oral (07/10/23 1424)  Respirations: 12 (07/10/23 1800)  Height: 5' 2" (157.5 cm) (07/10/23 2012)  Weight - Scale: 50.1 kg (110 lb 7.2 oz) (07/10/23 1940)  SpO2: 97 % (07/10/23 1858)    Physical Exam  Vitals reviewed. Constitutional:       General: She is not in acute distress. Appearance: She is ill-appearing. She is not toxic-appearing. HENT:      Head: Normocephalic. Nose: Nose normal.      Mouth/Throat:      Mouth: Mucous membranes are dry. Eyes:      General: No scleral icterus. Conjunctiva/sclera: Conjunctivae normal.   Cardiovascular:      Rate and Rhythm: Normal rate. Pulses: Normal pulses. Radial pulses are 2+ on the right side and 2+ on the left side. Heart sounds: Normal heart sounds. Pulmonary:      Effort: Pulmonary effort is normal.      Breath sounds: Normal breath sounds. Abdominal:      General: Bowel sounds are normal. There is no distension. Palpations: Abdomen is soft. Tenderness: There is no abdominal tenderness. Musculoskeletal:         General: No tenderness. Skin:     General: Skin is dry. Coloration: Skin is not jaundiced. Neurological:      Mental Status: She is alert and oriented to person, place, and time.  Mental status is at baseline. Motor: Weakness present. Psychiatric:         Mood and Affect: Mood normal.         Behavior: Behavior normal. Behavior is cooperative. Additional Data:     Lab Results:  Results from last 7 days   Lab Units 07/10/23  1442   WBC Thousand/uL 11.68*   HEMOGLOBIN g/dL 12.5   HEMATOCRIT % 38.8   PLATELETS Thousands/uL 451*   NEUTROS PCT % 88*   LYMPHS PCT % 6*   MONOS PCT % 5   EOS PCT % 0     Results from last 7 days   Lab Units 07/10/23  1937 07/10/23  1442   SODIUM mmol/L  --  143   POTASSIUM mmol/L 3.4* 2.8*   CHLORIDE mmol/L  --  109*   CO2 mmol/L  --  24   BUN mg/dL  --  17   CREATININE mg/dL  --  0.66   ANION GAP mmol/L  --  10   CALCIUM mg/dL  --  8.0*   ALBUMIN g/dL  --  3.1*   TOTAL BILIRUBIN mg/dL  --  0.98   ALK PHOS U/L  --  129*   ALT U/L  --  9   AST U/L  --  12*   GLUCOSE RANDOM mg/dL  --  108         Results from last 7 days   Lab Units 07/10/23  1428   POC GLUCOSE mg/dl 110         Results from last 7 days   Lab Units 07/10/23  1442   LACTIC ACID mmol/L 1.3       Imaging Results Reviewed as noted below  XR chest 1 view portable   ED Interpretation by Alexandra Arroyo PA-C (07/10 3624)   Right pleural effusion, atelectasis vs infiltrate RLL            No results found. No Chest XR results available for this patient. EKG and Other Studies Reviewed on Admission:   · EKG: Afib with RVR    Recent Labs     07/10/23  1437   VENTRATE 138         ** Please Note: This note has been constructed using a voice recognition system.  **

## 2023-07-10 NOTE — ASSESSMENT & PLAN NOTE
Patient presenting with symptoms of SOB and lethargic    · Rales present  · Imaging showed  · This is suspected to be secondary to  but cannot be definitively diagnosed without fluid studies    Plan:  · Lasix diuresis  · Maintain K > 4 and magnesium > 2  · Monitor on Tele  · Monitor I/O's  · Diet restriction fluid of 1800 mL per day   · Patient would benefit from diagnostic and therapeutic thoracentesis;  Deferring procedure to be done; opting for more conservative interention

## 2023-07-10 NOTE — ASSESSMENT & PLAN NOTE
Presenting with worsening SOB and increasing O2 needs now needing 2L    At baseline, uses no supplemental O2    Plan:  • Treat underlying pleural effusion  • Wean O2 as able

## 2023-07-10 NOTE — ASSESSMENT & PLAN NOTE
Generalized weakness and malaise for the past 2 days  May be attributed to UTI  UA showing innumerable WBCs and bacteria; received 1 dose of ceftriaxone in the ED    Continue antibiotic regimen  Follow urine culture and sensitivities

## 2023-07-10 NOTE — ED PROVIDER NOTES
History  Chief Complaint   Patient presents with   • Failure To Thrive     Pt arrives with EMS from home, family reports decreased oral intake and ambulation. Pt family reports that family doctor saw her last week and these symptoms were not present. This is a 80-year-old female patient presenting to the emergency room for malaise and decreased appetite since Sunday. Granddaughter is present to help supplement history. She is a primary caregiver. Apparently last week she saw a family doctor and at that time was amatory acting herself eating and drinking per usual.  Was apparently doing well at that appointment. Yesterday she "became dead weight". Asking to go to God. She has been laying in bed since that time. She denies any chest pain abdominal pain headache nausea or vomiting. She has no extremity weakness. Per daughter her speech is baseline, she has no confusion and no change in mental status. She has not had any fevers chills cough or congestion. History provided by:  Patient (Granddaughter)   used: No        Prior to Admission Medications   Prescriptions Last Dose Informant Patient Reported? Taking?   atenolol (TENORMIN) 25 mg tablet   Yes No   Sig: Take 25 mg by mouth daily   dipyridamole (PERSANTINE) 25 mg tablet   Yes No   Sig: Take 25 mg by mouth 4 (four) times a day   omeprazole (PriLOSEC) 20 mg delayed release capsule   Yes No   Sig: Take 20 mg by mouth daily   propylthiouracil 50 mg tablet   Yes No   Sig: Take 50 mg by mouth 3 (three) times a day   quinapril (ACCUPRIL) 40 MG tablet   Yes No   Sig: Take 40 mg by mouth daily at bedtime      Facility-Administered Medications: None       Past Medical History:   Diagnosis Date   • Atrial fibrillation (HCC)    • GERD (gastroesophageal reflux disease)    • Heart attack [facility])     unknown when this happened   • Hypertension        History reviewed. No pertinent surgical history. History reviewed.  No pertinent family history. I have reviewed and agree with the history as documented. E-Cigarette/Vaping   • E-Cigarette Use Never User      E-Cigarette/Vaping Substances     Social History     Tobacco Use   • Smoking status: Never   • Smokeless tobacco: Never   Vaping Use   • Vaping Use: Never used   Substance Use Topics   • Alcohol use: Never   • Drug use: Never       Review of Systems   Constitutional: Positive for appetite change and fatigue. Negative for chills and fever. HENT: Negative for ear pain and sore throat. Eyes: Negative for pain and visual disturbance. Respiratory: Negative for cough and shortness of breath. Cardiovascular: Negative for chest pain and palpitations. Gastrointestinal: Negative for abdominal pain and vomiting. Genitourinary: Negative for dysuria and hematuria. Musculoskeletal: Negative for arthralgias and back pain. Skin: Negative for color change and rash. Neurological: Negative for seizures and syncope. All other systems reviewed and are negative. Physical Exam  Physical Exam  Vitals and nursing note reviewed. Constitutional:       General: She is not in acute distress. Appearance: She is cachectic. She is not ill-appearing, toxic-appearing or diaphoretic. HENT:      Head: Normocephalic and atraumatic. Eyes:      Conjunctiva/sclera: Conjunctivae normal.      Comments: Pale conjunctiva   Cardiovascular:      Rate and Rhythm: Normal rate and regular rhythm. Heart sounds: No murmur heard. Comments: Trace peripheral edema in lower extremities  Pulmonary:      Effort: Pulmonary effort is normal. No respiratory distress. Breath sounds: Normal breath sounds. Abdominal:      Palpations: Abdomen is soft. Tenderness: There is no abdominal tenderness. Musculoskeletal:         General: No swelling. Cervical back: Neck supple. Skin:     General: Skin is warm and dry. Capillary Refill: Capillary refill takes less than 2 seconds. Coloration: Skin is pale. Neurological:      Mental Status: She is alert. Psychiatric:         Mood and Affect: Mood normal.         Vital Signs  ED Triage Vitals [07/10/23 1424]   Temperature Pulse Respirations Blood Pressure SpO2   97.7 °F (36.5 °C) (!) 142 14 (!) 228/131 95 %      Temp Source Heart Rate Source Patient Position - Orthostatic VS BP Location FiO2 (%)   Oral Monitor Lying Right arm --      Pain Score       No Pain           Vitals:    07/10/23 1550 07/10/23 1551 07/10/23 1630 07/10/23 1700   BP: (!) 197/130 (!) 197/130 (!) 195/127 112/76   Pulse: (!) 154  (!) 156 (!) 112   Patient Position - Orthostatic VS:   Lying Lying         Visual Acuity      ED Medications  Medications   atenolol (TENORMIN) tablet 25 mg (25 mg Oral Given 7/10/23 1551)   sodium chloride 0.9 % bolus 1,000 mL (0 mL Intravenous Stopped 7/10/23 1718)   magnesium sulfate 2 g/50 mL IVPB (premix) 2 g (2 g Intravenous New Bag 7/10/23 1521)   potassium chloride (K-DUR,KLOR-CON) CR tablet 40 mEq (40 mEq Oral Given 7/10/23 1529)   potassium chloride 20 mEq IVPB (premix) (20 mEq Intravenous New Bag 7/10/23 1521)   furosemide (LASIX) injection 40 mg (40 mg Intravenous Given 7/10/23 1550)   lisinopril (ZESTRIL) tablet 40 mg (40 mg Oral Given 7/10/23 1551)   cefTRIAXone (ROCEPHIN) IVPB (premix in dextrose) 1,000 mg 50 mL (0 mg Intravenous Stopped 7/10/23 1702)   diltiazem (CARDIZEM) injection 12.5 mg (12.5 mg Intravenous Given 7/10/23 1627)       Diagnostic Studies  Results Reviewed     Procedure Component Value Units Date/Time    HS Troponin I 4hr [[id number]] Collected: 07/10/23 1724    Lab Status: In process Specimen: Blood Updated: 07/10/23 1725    HS Troponin I 2hr [[id number]] Collected: 07/10/23 1642    Lab Status: No result Specimen: Blood from Arm, Right     Blood culture #2 [[id number]] Collected: 07/10/23 1621    Lab Status:  In process Specimen: Blood from Arm, Left Updated: 07/10/23 1628    Blood culture #1 [[id number]] Collected: 07/10/23 1624    Lab Status: In process Specimen: Blood from Arm, Right Updated: 07/10/23 1627    B-Type Natriuretic Peptide(BNP) [[id number]]  (Abnormal) Collected: 07/10/23 1442    Lab Status: Final result Specimen: Blood from Arm, Left Updated: 07/10/23 1602      pg/mL     FLU/RSV/COVID - if FLU/RSV clinically relevant [[id number]]  (Normal) Collected: 07/10/23 1442    Lab Status: Final result Specimen: Nares from Nose Updated: 07/10/23 1554     SARS-CoV-2 Negative     INFLUENZA A PCR Negative     INFLUENZA B PCR Negative     RSV PCR Negative    Narrative:      FOR PEDIATRIC PATIENTS - copy/paste COVID Guidelines URL to browser: [email or web address]    SARS-CoV-2 assay is a Nucleic Acid Amplification assay intended for the  qualitative detection of nucleic acid from SARS-CoV-2 in nasopharyngeal  swabs. Results are for the presumptive identification of SARS-CoV-2 RNA. Positive results are indicative of infection with SARS-CoV-2, the virus  causing COVID-19, but do not rule out bacterial infection or co-infection  with other viruses. Laboratories within the [facility] and its  territories are required to report all positive results to the appropriate  public health authorities. Negative results do not preclude SARS-CoV-2  infection and should not be used as the sole basis for treatment or other  patient management decisions. Negative results must be combined with  clinical observations, patient history, and epidemiological information. This test has not been FDA cleared or approved. This test has been authorized by FDA under an Emergency Use Authorization  (EUA).  This test is only authorized for the duration of time the  declaration that circumstances exist justifying the authorization of the  emergency use of an in vitro diagnostic tests for detection of SARS-CoV-2  virus and/or diagnosis of COVID-19 infection under section 564(b)(1) of  the Act, 21 U.S.C. 360bbb-3(b)(1), unless the authorization is terminated  or revoked sooner. The test has been validated but independent review by FDA  and CLIA is pending. Test performed using [employer]: This RT-PCR assay targets N2,  a region unique to SARS-CoV-2. A conserved region in the E-gene was chosen  for pan-Sarbecovirus detection which includes SARS-CoV-2. According to CMS-2020-01-R, this platform meets the definition of high-throughput technology. Urine Microscopic [[id number]]  (Abnormal) Collected: 07/10/23 1509    Lab Status: Final result Specimen: Urine, Clean Catch Updated: 07/10/23 1538     RBC, UA 10-20 /hpf      WBC, UA Innumerable /hpf      Epithelial Cells Moderate /hpf      Bacteria, UA Innumerable /hpf      MUCUS THREADS Innumerable     WBC Clumps Present     Budding Yeast Present    Urine culture [[id number]] Collected: 07/10/23 1509    Lab Status:  In process Specimen: Urine, Clean Catch Updated: 07/10/23 1538    UA w Reflex to Microscopic w Reflex to Culture [[id number]]  (Abnormal) Collected: 07/10/23 1509    Lab Status: Final result Specimen: Urine, Clean Catch Updated: 07/10/23 1531     Color, UA Yellow     Clarity, UA Extra Turbid     Specific Gravity, UA 1.017     pH, UA 6.0     Leukocytes, UA Large     Nitrite, UA Negative     Protein, UA 70 (1+) mg/dl      Glucose, UA Negative mg/dl      Ketones, UA Negative mg/dl      Urobilinogen, UA 3.0 mg/dl      Bilirubin, UA Negative     Occult Blood, UA Small    HS Troponin 0hr (reflex protocol) [[id number]]  (Abnormal) Collected: 07/10/23 1442    Lab Status: Final result Specimen: Blood from Arm, Left Updated: 07/10/23 1518     hs TnI 0hr 52 ng/L     Comprehensive metabolic panel [[id number]]  (Abnormal) Collected: 07/10/23 1442    Lab Status: Final result Specimen: Blood from Arm, Left Updated: 07/10/23 1510     Sodium 143 mmol/L      Potassium 2.8 mmol/L      Chloride 109 mmol/L      CO2 24 mmol/L      ANION GAP 10 mmol/L      BUN 17 mg/dL      Creatinine 0.66 mg/dL      Glucose 108 mg/dL      Calcium 8.0 mg/dL      Corrected Calcium 8.7 mg/dL      AST 12 U/L      ALT 9 U/L      Alkaline Phosphatase 129 U/L      Total Protein 7.0 g/dL      Albumin 3.1 g/dL      Total Bilirubin 0.98 mg/dL      eGFR 73 ml/min/1.73sq m     Narrative:      [facility] guidelines for Chronic Kidney Disease (CKD):   •  Stage 1 with normal or high GFR (GFR > 90 mL/min/1.73 square meters)  •  Stage 2 Mild CKD (GFR = 60-89 mL/min/1.73 square meters)  •  Stage 3A Moderate CKD (GFR = 45-59 mL/min/1.73 square meters)  •  Stage 3B Moderate CKD (GFR = 30-44 mL/min/1.73 square meters)  •  Stage 4 Severe CKD (GFR = 15-29 mL/min/1.73 square meters)  •  Stage 5 End Stage CKD (GFR <15 mL/min/1.73 square meters)  Note: GFR calculation is accurate only with a steady state creatinine    Magnesium [[id number]]  (Abnormal) Collected: 07/10/23 1442    Lab Status: Final result Specimen: Blood from Arm, Left Updated: 07/10/23 1510     Magnesium 1.7 mg/dL     CK [[id number]]  (Normal) Collected: 07/10/23 1442    Lab Status: Final result Specimen: Blood from Arm, Left Updated: 07/10/23 1510     Total CK 29 U/L     Lactic acid, plasma (w/reflex if result > 2.0) [[id number]]  (Normal) Collected: 07/10/23 1442    Lab Status: Final result Specimen: Blood from Arm, Left Updated: 07/10/23 1508     LACTIC ACID 1.3 mmol/L     Narrative:      Result may be elevated if tourniquet was used during collection.     CBC and differential [[id number]]  (Abnormal) Collected: 07/10/23 1442    Lab Status: Final result Specimen: Blood from Arm, Left Updated: 07/10/23 1449     WBC 11.68 Thousand/uL      RBC 4.45 Million/uL      Hemoglobin 12.5 g/dL      Hematocrit 38.8 %      MCV 87 fL      MCH 28.1 pg      MCHC 32.2 g/dL      RDW 15.6 %      MPV 9.8 fL      Platelets 189 Thousands/uL      nRBC 0 /100 WBCs      Neutrophils Relative 88 %      Immat GRANS % 1 %      Lymphocytes Relative 6 % Monocytes Relative 5 %      Eosinophils Relative 0 %      Basophils Relative 0 %      Neutrophils Absolute 10.34 Thousands/µL      Immature Grans Absolute 0.07 Thousand/uL      Lymphocytes Absolute 0.66 Thousands/µL      Monocytes Absolute 0.55 Thousand/µL      Eosinophils Absolute 0.04 Thousand/µL      Basophils Absolute 0.02 Thousands/µL     Fingerstick Glucose (POCT) [[id number]]  (Normal) Collected: 07/10/23 1428    Lab Status: Final result Updated: 07/10/23 1432     POC Glucose 110 mg/dl                  XR chest 1 view portable   ED Interpretation by Laly De La Garza PA-C (07/10 1504)   Right pleural effusion, atelectasis vs infiltrate RLL                   Procedures  ECG 12 Lead Documentation Only    Date/Time: 7/10/2023 2:52 PM    Performed by: Laly De La Garza PA-C  Authorized by: Laly De La Garza PA-C    ECG reviewed by me, the ED Provider: yes    Patient location:  ED  Interpretation:     Interpretation: normal    Quality:     Tracing quality:  Limited by artifact  Rate:     ECG rate:  138    ECG rate assessment: tachycardic    Rhythm:     Rhythm: atrial fibrillation    Ectopy:     Ectopy: none    QRS:     QRS axis:  Normal    QRS intervals:  Normal  Conduction:     Conduction: normal    ST segments:     ST segments:  Normal  T waves:     T waves: non-specific               ED Course  ED Course as of 07/10/23 1725   Mon Jul 10, 2023   1542 I explained to the patient's granddaughter that there is a pleural effusion present and with her having an episode of desaturation would recommend thoracentesis. At this time both the patient and granddaughter are unsure that she would like to have a procedure done. I explained its minimally invasive done under local anesthesia and not general anesthesia. I did discuss the risks of this procedure. This point time the patient does not endorse any shortness of breath although she was hypoxic.   The patient and granddaughter would not like to pursue a thoracentesis at this time. We discussed the use of Lasix. They are agreeable to using Lasix. 1612 After fluid and subsequent Lasix rate remains uncontrolled, will give a dose of Cardizem. HEART Risk Score    Flowsheet Row Most Recent Value   Heart Score Risk Calculator    History 0 Filed at: 07/10/2023 1725   ECG 1 Filed at: 07/10/2023 1725   Age 2 Filed at: 07/10/2023 1725   Risk Factors 2 Filed at: 07/10/2023 1725   Troponin 2 Filed at: 07/10/2023 1725   HEART Score 7 Filed at: 07/10/2023 1725                        SBIRT 22yo+    Flowsheet Row Most Recent Value   Initial Alcohol Screen: US AUDIT-C     1. How often do you have a drink containing alcohol? 0 Filed at: 07/10/2023 1433   2. How many drinks containing alcohol do you have on a typical day you are drinking? 0 Filed at: 07/10/2023 1433   3b. FEMALE Any Age, or MALE 65+: How often do you have 4 or more drinks on one occassion? 0 Filed at: 07/10/2023 1433   Audit-C Score 0 Filed at: 07/10/2023 1433   NADIA: How many times in the past year have you. .. Used an illegal drug or used a prescription medication for non-medical reasons? Never Filed at: 07/10/2023 1433                    Medical Decision Making  Differential diagnosis includes but is not limited to MI, ACS, infectious etiology including UTI, rhabdomyolysis, BIB, metabolic abnormality, dehydration, CVA/TIA. Plan: Cardiac work-up. X-ray chest.      MDM: 80year-old with malaise/generalized weakness. Found to have UTI. Elevated troponin, elevated BNP. New right-sided pleural effusion. Could be acute CHF. A-fib with RVR controlled with Cardizem. Hypertensive urgency controlled with administering her home BP meds which she did not take this morning. I had a lengthy discussion with the patient as well as the granddaughter who is primary caregiver for the patient. We discussed thoracentesis for this pleural effusion particular given she had an episode of hypoxia here.   Both the patient and the granddaughter do not feel she would want any aggressive therapy including any minimally invasive procedures at this point. They would still like to think about it but at this time would like to defer thoracentesis. Granddaughter is concerned at this point in the patient's life that she does not want the patient to suffer or have poor quality of life. States that the patient's /her grandfather also had similar medical problems, was diagnosed with CHF and was in and out of the hospital multiple times in the course of a few months and the patient and the granddaughter do not want to have to go through similar things and would like to make sure she remains comfortable. Will defer thoracentesis, admit for continued medical management of her UTI as well as IV diuretics for her volume overload. Discussed with internal medicine. Patient as well as granddaughter understand agree with plan. Amount and/or Complexity of Data Reviewed  Labs: ordered. Radiology: ordered and independent interpretation performed. Risk  Prescription drug management. Decision regarding hospitalization. Disposition  Final diagnoses:   UTI (urinary tract infection)   Pleural effusion   CHF (congestive heart failure) ([address])     Time reflects when diagnosis was documented in both MDM as applicable and the Disposition within this note     Time User Action Codes Description Comment    7/10/2023  5:22 PM Franchot  L Add [N39.0] UTI (urinary tract infection)     7/10/2023  5:22 PM Franchot  L Add [J90] Pleural effusion     7/10/2023  5:22 PM Franchot  L Add [I50.9] CHF (congestive heart failure) [facility])       ED Disposition     ED Disposition   Admit    Condition   Stable    Date/Time   Mon Jul 10, 2023  5:22 PM    Comment   Case was discussed with Dr. Dee Torres and the patient's admission status was agreed to be Admission Status: inpatient status to the service of Dr. Dee Torres . Follow-up Information    None         Patient's Medications   Discharge Prescriptions    No medications on file       No discharge procedures on file.     PDMP Review     None          ED Provider  Electronically Signed by           Jordi Berry PA-C  07/10/23 1940

## 2023-07-10 NOTE — ASSESSMENT & PLAN NOTE
Blood Pressure: 112/76      Plan:  • Continue home meds  • Monitor blood pressure  • PRN BP Meds ordered for SBP > 160

## 2023-07-11 ENCOUNTER — APPOINTMENT (INPATIENT)
Dept: NON INVASIVE DIAGNOSTICS | Facility: HOSPITAL | Age: 88
DRG: 186 | End: 2023-07-11
Payer: MEDICARE

## 2023-07-11 ENCOUNTER — HOME CARE VISIT (OUTPATIENT)
Dept: HOME HEALTH SERVICES | Facility: HOME HEALTHCARE | Age: 88
End: 2023-07-11

## 2023-07-11 LAB
ALBUMIN SERPL BCP-MCNC: 3.2 G/DL (ref 3.5–5)
ALP SERPL-CCNC: 143 U/L (ref 34–104)
ALT SERPL W P-5'-P-CCNC: 10 U/L (ref 7–52)
ANION GAP SERPL CALCULATED.3IONS-SCNC: 11 MMOL/L
ANION GAP SERPL CALCULATED.3IONS-SCNC: 9 MMOL/L
AORTIC ROOT: 2.6 CM
APICAL FOUR CHAMBER EJECTION FRACTION: 61 %
ASCENDING AORTA: 3 CM
AST SERPL W P-5'-P-CCNC: 17 U/L (ref 13–39)
ATRIAL RATE: 227 BPM
BASOPHILS # BLD AUTO: 0.02 THOUSANDS/ÂΜL (ref 0–0.1)
BASOPHILS NFR BLD AUTO: 0 % (ref 0–1)
BILIRUB SERPL-MCNC: 0.95 MG/DL (ref 0.2–1)
BUN SERPL-MCNC: 17 MG/DL (ref 5–25)
BUN SERPL-MCNC: 18 MG/DL (ref 5–25)
CALCIUM ALBUM COR SERPL-MCNC: 8.9 MG/DL (ref 8.3–10.1)
CALCIUM SERPL-MCNC: 8.3 MG/DL (ref 8.4–10.2)
CALCIUM SERPL-MCNC: 8.7 MG/DL (ref 8.4–10.2)
CHLORIDE SERPL-SCNC: 108 MMOL/L (ref 96–108)
CHLORIDE SERPL-SCNC: 108 MMOL/L (ref 96–108)
CHOLEST SERPL-MCNC: 159 MG/DL
CO2 SERPL-SCNC: 22 MMOL/L (ref 21–32)
CO2 SERPL-SCNC: 24 MMOL/L (ref 21–32)
CREAT SERPL-MCNC: 0.77 MG/DL (ref 0.6–1.3)
CREAT SERPL-MCNC: 0.82 MG/DL (ref 0.6–1.3)
E WAVE DECELERATION TIME: 109 MS
EOSINOPHIL # BLD AUTO: 0.02 THOUSAND/ÂΜL (ref 0–0.61)
EOSINOPHIL NFR BLD AUTO: 0 % (ref 0–6)
ERYTHROCYTE [DISTWIDTH] IN BLOOD BY AUTOMATED COUNT: 15.9 % (ref 11.6–15.1)
FRACTIONAL SHORTENING: 11 % (ref 28–44)
GFR SERPL CREATININE-BSD FRML MDRD: 59 ML/MIN/1.73SQ M
GFR SERPL CREATININE-BSD FRML MDRD: 64 ML/MIN/1.73SQ M
GLUCOSE SERPL-MCNC: 101 MG/DL (ref 65–140)
GLUCOSE SERPL-MCNC: 103 MG/DL (ref 65–140)
HCT VFR BLD AUTO: 43.3 % (ref 34.8–46.1)
HDLC SERPL-MCNC: 28 MG/DL
HGB BLD-MCNC: 13.7 G/DL (ref 11.5–15.4)
IMM GRANULOCYTES # BLD AUTO: 0.07 THOUSAND/UL (ref 0–0.2)
IMM GRANULOCYTES NFR BLD AUTO: 1 % (ref 0–2)
INR PPP: 1.4 (ref 0.84–1.19)
INTERVENTRICULAR SEPTUM IN DIASTOLE (PARASTERNAL SHORT AXIS VIEW): 1.1 CM
INTERVENTRICULAR SEPTUM: 1.1 CM (ref 0.6–1.1)
LA/AORTA RATIO 2D: 1.85
LAAS-AP2: 20.6 CM2
LAAS-AP4: 18.7 CM2
LDLC SERPL CALC-MCNC: 105 MG/DL (ref 0–100)
LEFT ATRIUM SIZE: 4.8 CM
LEFT ATRIUM VOLUME (MOD BIPLANE): 54 ML
LEFT INTERNAL DIMENSION IN SYSTOLE: 2.4 CM (ref 2.1–4)
LEFT VENTRICULAR INTERNAL DIMENSION IN DIASTOLE: 2.7 CM (ref 3.5–6)
LEFT VENTRICULAR POSTERIOR WALL IN END DIASTOLE: 0.9 CM
LEFT VENTRICULAR STROKE VOLUME: 7 ML
LVSV (TEICH): 7 ML
LYMPHOCYTES # BLD AUTO: 0.68 THOUSANDS/ÂΜL (ref 0.6–4.47)
LYMPHOCYTES NFR BLD AUTO: 7 % (ref 14–44)
MAGNESIUM SERPL-MCNC: 2.2 MG/DL (ref 1.9–2.7)
MCH RBC QN AUTO: 27.7 PG (ref 26.8–34.3)
MCHC RBC AUTO-ENTMCNC: 31.6 G/DL (ref 31.4–37.4)
MCV RBC AUTO: 88 FL (ref 82–98)
MITRAL REGURGITATION PEAK VELOCITY: 3.94 M/S
MITRAL VALVE MEAN INFLOW VELOCITY: 2.88 M/S
MITRAL VALVE REGURGITANT PEAK GRADIENT: 62 MMHG
MONOCYTES # BLD AUTO: 0.47 THOUSAND/ÂΜL (ref 0.17–1.22)
MONOCYTES NFR BLD AUTO: 5 % (ref 4–12)
MV PEAK A VEL: 0.32 M/S
MV PEAK E VEL: 117 CM/S
MV STENOSIS PRESSURE HALF TIME: 32 MS
MV VALVE AREA P 1/2 METHOD: 6.88 CM2
NEUTROPHILS # BLD AUTO: 8.9 THOUSANDS/ÂΜL (ref 1.85–7.62)
NEUTS SEG NFR BLD AUTO: 87 % (ref 43–75)
NRBC BLD AUTO-RTO: 0 /100 WBCS
PLATELET # BLD AUTO: 467 THOUSANDS/UL (ref 149–390)
PMV BLD AUTO: 10.1 FL (ref 8.9–12.7)
POTASSIUM SERPL-SCNC: 5.3 MMOL/L (ref 3.5–5.3)
POTASSIUM SERPL-SCNC: 5.8 MMOL/L (ref 3.5–5.3)
PROT SERPL-MCNC: 7.3 G/DL (ref 6.4–8.4)
PROTHROMBIN TIME: 16.9 SECONDS (ref 11.6–14.5)
QRS AXIS: -35 DEGREES
QRSD INTERVAL: 74 MS
QT INTERVAL: 318 MS
QTC INTERVAL: 481 MS
RBC # BLD AUTO: 4.94 MILLION/UL (ref 3.81–5.12)
SL CV DOP CALC MV VTI RETROGRADE: 89.2 CM
SL CV MV MEAN GRADIENT RETROGRADE: 38 MMHG
SL CV PED ECHO LEFT VENTRICLE DIASTOLIC VOLUME (MOD BIPLANE) 2D: 27 ML
SL CV PED ECHO LEFT VENTRICLE SYSTOLIC VOLUME (MOD BIPLANE) 2D: 20 ML
SODIUM SERPL-SCNC: 141 MMOL/L (ref 135–147)
SODIUM SERPL-SCNC: 141 MMOL/L (ref 135–147)
T WAVE AXIS: 229 DEGREES
TR MAX PG: 49 MMHG
TRICUSPID ANNULAR PLANE SYSTOLIC EXCURSION: 1.2 CM
TRICUSPID VALVE PEAK REGURGITATION VELOCITY: 2.62 M/S
TRIGL SERPL-MCNC: 129 MG/DL
VENTRICULAR RATE: 138 BPM
WBC # BLD AUTO: 10.16 THOUSAND/UL (ref 4.31–10.16)

## 2023-07-11 PROCEDURE — 80048 BASIC METABOLIC PNL TOTAL CA: CPT | Performed by: NURSE PRACTITIONER

## 2023-07-11 PROCEDURE — 80053 COMPREHEN METABOLIC PANEL: CPT | Performed by: INTERNAL MEDICINE

## 2023-07-11 PROCEDURE — 85610 PROTHROMBIN TIME: CPT

## 2023-07-11 PROCEDURE — 99232 SBSQ HOSP IP/OBS MODERATE 35: CPT | Performed by: NURSE PRACTITIONER

## 2023-07-11 PROCEDURE — 93306 TTE W/DOPPLER COMPLETE: CPT

## 2023-07-11 PROCEDURE — 85025 COMPLETE CBC W/AUTO DIFF WBC: CPT | Performed by: INTERNAL MEDICINE

## 2023-07-11 PROCEDURE — 93306 TTE W/DOPPLER COMPLETE: CPT | Performed by: INTERNAL MEDICINE

## 2023-07-11 PROCEDURE — 93010 ELECTROCARDIOGRAM REPORT: CPT | Performed by: INTERNAL MEDICINE

## 2023-07-11 PROCEDURE — 83735 ASSAY OF MAGNESIUM: CPT | Performed by: INTERNAL MEDICINE

## 2023-07-11 PROCEDURE — 80061 LIPID PANEL: CPT | Performed by: INTERNAL MEDICINE

## 2023-07-11 RX ORDER — ENOXAPARIN SODIUM 100 MG/ML
30 INJECTION SUBCUTANEOUS DAILY
Status: DISCONTINUED | OUTPATIENT
Start: 2023-07-12 | End: 2023-07-14 | Stop reason: HOSPADM

## 2023-07-11 RX ADMIN — ENOXAPARIN SODIUM 40 MG: 40 INJECTION SUBCUTANEOUS at 09:06

## 2023-07-11 RX ADMIN — FUROSEMIDE 40 MG: 10 INJECTION, SOLUTION INTRAMUSCULAR; INTRAVENOUS at 09:06

## 2023-07-11 RX ADMIN — PANTOPRAZOLE SODIUM 40 MG: 40 TABLET, DELAYED RELEASE ORAL at 05:18

## 2023-07-11 RX ADMIN — PROPYLTHIOURACIL 50 MG: 50 TABLET ORAL at 18:34

## 2023-07-11 RX ADMIN — CEFTRIAXONE 1000 MG: 1 INJECTION, SOLUTION INTRAVENOUS at 18:32

## 2023-07-11 RX ADMIN — ATENOLOL 25 MG: 25 TABLET ORAL at 09:06

## 2023-07-11 RX ADMIN — PROPYLTHIOURACIL 50 MG: 50 TABLET ORAL at 09:06

## 2023-07-11 RX ADMIN — PROPYLTHIOURACIL 50 MG: 50 TABLET ORAL at 22:50

## 2023-07-11 RX ADMIN — FUROSEMIDE 40 MG: 10 INJECTION, SOLUTION INTRAMUSCULAR; INTRAVENOUS at 18:32

## 2023-07-11 NOTE — OCCUPATIONAL THERAPY NOTE
Occupational Therapy Cancellation note        Patient Name: Elvin Homans  GJMDV'B Date: 7/11/2023      OT orders received. Chart reviewed and pt w/ elevated heart rate on telemetry and not appropriate for therapy and DAPHNE Maynard reports to hold eval. Will continued to follow to address OT eval as appropriate.     Mary Mckeon MS, OTR/L

## 2023-07-11 NOTE — PHYSICAL THERAPY NOTE
Physical Therapy Cancellation     Patient's Name: Dale Villarealkeley    Admitting Diagnosis  CHF (congestive heart failure) ([address]) [I50.9]  UTI (urinary tract infection) [N39.0]  Pleural effusion [J90]  Failure to thrive in adult [R62.7]    Problem List  Patient Active Problem List   Diagnosis    Acute respiratory failure with hypoxia (HCC)    Paroxysmal atrial fibrillation (HCC)    Essential hypertension    Pleural effusion on right    Generalized weakness    Ambulatory dysfunction       Past Medical History  Past Medical History:   Diagnosis Date    Atrial fibrillation ([address])     GERD (gastroesophageal reflux disease)     Heart attack ([address])     unknown when this happened    Hypertension        Past Surgical History  History reviewed. No pertinent surgical history. 07/11/23 0821   PT Last Visit   PT Visit Date 07/11/23   Note Type   Note type Cancelled Session   Cancel Reasons Medical status   Additional Comments PT order received. Chart review performed. At this time, PT evaluation cancelled as pt with noted elevated HR on telemetry, discussed with DAPHNE Palumbo- will hold PT eval at this time. PT will follow and evaluate as appropriate.            Unique Gordon, PT

## 2023-07-11 NOTE — PLAN OF CARE
Problem: MOBILITY - ADULT  Goal: Maintain or return to baseline ADL function  Description: INTERVENTIONS:  -  Assess patient's ability to carry out ADLs; assess patient's baseline for ADL function and identify physical deficits which impact ability to perform ADLs (bathing, care of mouth/teeth, toileting, grooming, dressing, etc.)  - Assess/evaluate cause of self-care deficits   - Assess range of motion  - Assess patient's mobility; develop plan if impaired  - Assess patient's need for assistive devices and provide as appropriate  - Encourage maximum independence but intervene and supervise when necessary  - Involve family in performance of ADLs  - Assess for home care needs following discharge   - Consider OT consult to assist with ADL evaluation and planning for discharge  - Provide patient education as appropriate  Outcome: Progressing  Goal: Maintains/Returns to pre admission functional level  Description: INTERVENTIONS:  - Perform BMAT or MOVE assessment daily.   - Set and communicate daily mobility goal to care team and patient/family/caregiver. - Collaborate with rehabilitation services on mobility goals if consulted  - Perform Range of Motion 2 times a day. - Reposition patient every 2 hours.   - Dangle patient  - Stand patient   - Ambulate patient   - Out of bed to chair   - Out of bed for meals  - Out of bed for toileting  - Record patient progress and toleration of activity level   Outcome: Progressing

## 2023-07-11 NOTE — ASSESSMENT & PLAN NOTE
Parox atrial fibrillation on cardizem and atenolol on no AC due to high risk of falls  HR 110s on admission, likely worsened by UTI and pleural effusion    Plan:  • Continue home meds  • Monitor rates/BP  • PRN Lopressor 5 mg IV q5 min for 5 doses for RVR

## 2023-07-11 NOTE — ASSESSMENT & PLAN NOTE
• Patient lives alone at home; patient/family interested in SNF  o Specifically, [facility] as patients daughter is there  Case management aware  • PT/OT

## 2023-07-11 NOTE — ASSESSMENT & PLAN NOTE
· Patient presented to the ED with complaints of shortness of breath; with hypoxia  · Requiring 2 L of supplemental oxygen to maintain her oxygen saturation levels. · Chest x-ray (7/10/23): Large right sided pleural effusion.   · BNP elevated at 889; no formal diagnosis of CHF  · Will obtain echocardiogram  · Granddaughter opting for hospice evaluation

## 2023-07-11 NOTE — ASSESSMENT & PLAN NOTE
· Parox atrial fibrillation on cardizem and atenolol on no AC due to high risk of falls  · HR 110s on admission, likely worsened by UTI and pleural effusion  • Continue home meds  • Monitor rates/BP  • PRN Lopressor 5 mg IV q5 min for 5 doses for RVR

## 2023-07-11 NOTE — PROGRESS NOTES
[address]  Progress Note  Name: Jak Hummel  MRN: [id number]  Unit/Bed#: -01 I Date of Admission: 7/10/2023   Date of Service: 7/11/2023 I Hospital Day: 1    Assessment/Plan   * Acute respiratory failure with hypoxia [facility])  Assessment & Plan  · Patient presented to the ED with complaints of shortness of breath; with hypoxia  · Requiring 2 L of supplemental oxygen to maintain her oxygen saturation levels. · Chest x-ray (7/10/23): Large right sided pleural effusion. · BNP elevated at 889; no formal diagnosis of CHF  · Will obtain echocardiogram  · Granddaughter opting for hospice evaluation    Ambulatory dysfunction  Assessment & Plan  • Patient lives alone at home; patient/family interested in SNF  o Specifically, [place] as patients daughter is there  Case management aware  • PT/OT    Generalized weakness  Assessment & Plan  · Generalized weakness and malaise for the past 2 days  · Multifactorial in setting of UTI, Volume overload, Age  · UA positive evidenced by large leukocytes, innumerable WBC, Bacteria innumberable  · Await urine culture     Pleural effusion on right  Assessment & Plan  · Patient presenting with symptoms of SOB and lethargic  · Lasix diuresis for now  · Monitor on Tele  · Monitor I/O's  · Diet restriction fluid of 1800 mL per day   · Patient would benefit from diagnostic and therapeutic thoracentesis;  Deferring procedure to be done; opting for more conservative interention  · Hospice evaluation placed    Essential hypertension  Assessment & Plan  • Continue home meds  • Monitor blood pressure  • PRN BP Meds ordered for SBP > 160    Paroxysmal atrial fibrillation (HCC)  Assessment & Plan  · Parox atrial fibrillation on cardizem and atenolol on no AC due to high risk of falls  · HR 110s on admission, likely worsened by UTI and pleural effusion  • Continue home meds  • Monitor rates/BP  • PRN Lopressor 5 mg IV q5 min for 5 doses for RVR         VTE Pharmacologic Prophylaxis: VTE Score: 6 High Risk (Score >/= 5) - Pharmacological DVT Prophylaxis Ordered: enoxaparin (Lovenox). Sequential Compression Devices Ordered. Patient Centered Rounds: I performed bedside rounds with nursing staff today. Discussions with Specialists or Other Care Team Provider: Case Management, Hospice liaison     Education and Discussions with Family / Patient: Updated  (granddaughter) at bedside. Total Time Spent on Date of Encounter in care of patient: 35 minutes This time was spent on one or more of the following: performing physical exam; counseling and coordination of care; obtaining or reviewing history; documenting in the medical record; reviewing/ordering tests, medications or procedures; communicating with other healthcare professionals and discussing with patient's family/caregivers. Current Length of Stay: 1 day(s)  Current Patient Status: Inpatient   Certification Statement: The patient will continue to require additional inpatient hospital stay due to ongoing treatment in setting of volume overload; need for hospice evaluation. Discharge Plan: Anticipate discharge in 24-48 hrs to rehab facility. Code Status: Level 3 - DNAR and DNI    Subjective:   Patient resting in bed, comfortably at this time. Granddaughter at the bedside. Re-addressed need for thoracentesis; however, grand daugther requesting hospice evaluation at this time as she just wants her to be comfortable and let "nature take its course."    Objective:     Vitals:   Temp (24hrs), Av.2 °F (36.8 °C), Min:97.7 °F (36.5 °C), Max:98.6 °F (37 °C)    Temp:  [97.7 °F (36.5 °C)-98.6 °F (37 °C)] 98.1 °F (36.7 °C)  HR:  [] 134  Resp:  [12-20] 12  BP: ()/() 122/79  SpO2:  [83 %-99 %] 99 %  Body mass index is 20.2 kg/m². Input and Output Summary (last 24 hours):      Intake/Output Summary (Last 24 hours) at 2023 8372  Last data filed at 2023 0616  Gross per 24 hour   Intake 1540 ml   Output 500 ml   Net 1040 ml       Physical Exam:   Physical Exam  Vitals and nursing note reviewed. Constitutional:       General: She is not in acute distress. Appearance: She is cachectic. She is ill-appearing. Comments: Frail   Cardiovascular:      Rate and Rhythm: Normal rate. Pulses: Normal pulses. Pulmonary:      Effort: Pulmonary effort is normal. Tachypnea present. No bradypnea or respiratory distress. Breath sounds: Decreased breath sounds present. Comments: 2 L  Abdominal:      General: Bowel sounds are normal.   Musculoskeletal:         General: Normal range of motion. Right lower leg: No edema. Left lower leg: No edema. Skin:     General: Skin is warm and dry. Coloration: Skin is pale. Neurological:      Mental Status: She is alert. Mental status is at baseline. Psychiatric:         Mood and Affect: Mood normal.          Additional Data:     Labs:  Results from last 7 days   Lab Units 07/11/23  0549   WBC Thousand/uL 10.16   HEMOGLOBIN g/dL 13.7   HEMATOCRIT % 43.3   PLATELETS Thousands/uL 467*   NEUTROS PCT % 87*   LYMPHS PCT % 7*   MONOS PCT % 5   EOS PCT % 0     Results from last 7 days   Lab Units 07/11/23  0549   SODIUM mmol/L 141   POTASSIUM mmol/L 5.8*   CHLORIDE mmol/L 108   CO2 mmol/L 24   BUN mg/dL 17   CREATININE mg/dL 0.77   ANION GAP mmol/L 9   CALCIUM mg/dL 8.3*   ALBUMIN g/dL 3.2*   TOTAL BILIRUBIN mg/dL 0.95   ALK PHOS U/L 143*   ALT U/L 10   AST U/L 17   GLUCOSE RANDOM mg/dL 103         Results from last 7 days   Lab Units 07/10/23  1428   POC GLUCOSE mg/dl 110         Results from last 7 days   Lab Units 07/10/23  1442   LACTIC ACID mmol/L 1.3       Lines/Drains:  Invasive Devices     Peripheral Intravenous Line  Duration           Peripheral IV 07/11/23 Distal;Dorsal (posterior); Right Forearm <1 day                  Telemetry:  Telemetry Orders (From admission, onward)             24 Hour Telemetry Monitoring Continuous x 24 Hours (Telem)        Question:  Reason for 24 Hour Telemetry  Answer:  Metabolic/electrolyte disturbance with high probability of dysrhythmia. K level <3 or >6 OR KCL infusion >10mEq/hr                 Telemetry Reviewed: Atrial fibrillation. HR averaging 120  Indication for Continued Telemetry Use: Arrthymias requiring medical therapy             Imaging: Reviewed radiology reports from this admission including: chest xray    Recent Cultures (last 7 days):   Results from last 7 days   Lab Units 07/10/23  1624 07/10/23  1621   BLOOD CULTURE  Received in Microbiology Lab. Culture in Progress. Received in Microbiology Lab. Culture in Progress. Last 24 Hours Medication List:   Current Facility-Administered Medications   Medication Dose Route Frequency Provider Last Rate   • acetaminophen  650 mg Oral Q6H PRN [facility] Howard, DO     • atenolol  25 mg Oral Daily [facility] Pban, DO     • cefTRIAXone  1,000 mg Intravenous Q24H [facility] Pban, DO     • dipyridamole  25 mg Oral 4x Daily [facility] Pban, DO     • enoxaparin  40 mg Subcutaneous Daily [facility] Howard, DO     • furosemide  40 mg Intravenous BID (diuretic) Albino Howard, DO     • hydrALAZINE  10 mg Intravenous Q6H PRN [facility] Pban, DO     • labetalol  10 mg Intravenous Q6H PRN [facility] Pban, DO     • metoprolol  5 mg Intravenous Q5 Min PRN [facility] Pban, DO     • ondansetron  4 mg Intravenous Q6H PRN [facility] Pban, DO     • pantoprazole  40 mg Oral Early Morning [facility] Howard, DO     • propylthiouracil  50 mg Oral TID Jessica Bergeron DO          Today, Patient Was Seen By: JUANITO Escobedo    **Please Note: This note may have been constructed using a voice recognition system. **

## 2023-07-11 NOTE — CASE MANAGEMENT
Case Management Discharge Planning Note    Patient name Candy Rodarte  Location [address] [facility] 332/-01 MRN [id number]  : 10/9/1923 Date 2023       Current Admission Date: 7/10/2023  Current Admission Diagnosis:Acute respiratory failure with hypoxia [facility])   Patient Active Problem List    Diagnosis Date Noted   • Acute respiratory failure with hypoxia ([address]) 07/10/2023   • Paroxysmal atrial fibrillation ([address]) 07/10/2023   • Essential hypertension 07/10/2023   • Pleural effusion on right 07/10/2023   • Generalized weakness 07/10/2023   • Ambulatory dysfunction 07/10/2023      LOS (days): 1  Geometric Mean LOS (GMLOS) (days): 3.80  Days to GMLOS:2.8     OBJECTIVE:  Risk of Unplanned Readmission Score: 11.67         Current admission status: Inpatient   [facility] Pharmacy:   [facility], [address]  [address] [place] [address]  Phone: [phone] Fax: [phone]    Primary Care Provider: Malissa Schaefer DO    Primary Insurance: MEDICARE  Secondary Insurance: AARP    DISCHARGE DETAILS:    Discharge planning discussed with[de-identified] Patients granddaughter at bedside  [place] of Choice: Yes  Comments - Freedom of Choice: CM discussed freedom of choice as it pertains to discharge planning. Patients granddaughter is agreeable to [facility] as patients daughter lives there.   CM contacted family/caregiver?: Yes  Were Treatment Team discharge recommendations reviewed with patient/caregiver?: Yes  Did patient/caregiver verbalize understanding of patient care needs?: Yes  Were patient/caregiver advised of the risks associated with not following Treatment Team discharge recommendations?: Yes         Requested [address]         Is the patient interested in [address] at discharge?: No    DME Referral Provided  Referral made for DME?: No    Other Referral/Resources/Interventions Provided:  Interventions: SNF  Referral Comments: [address]  [address]  Phone: [phone]  Fax: [phone] RESERVED    Would you like to participate in our [address] service program?  : No - Declined    Treatment Team Recommendation: SNF  Discharge Destination Plan[de-identified] SNF  Transport at Discharge : [facility] Ambulance

## 2023-07-11 NOTE — HOSPICE NOTE
Hospice referral received. Pt approved for hospice at home or a SNF. [facility] hospice liaison to meet with pt and her grand daughter today. Hospice will follow to discharge.  REECE Flores notified of the same

## 2023-07-11 NOTE — ACP (ADVANCE CARE PLANNING)
Advanced Care Planning Progress Note    Serious Illness Conversation    1. What is your understanding now of where you are with your illness? Prognostic Understanding: appropriate understanding of prognosis     2. How much information about what is likely to be ahead with your illness would you like to have? Information: patient wants to be fully informed     3. What did you (clinician) communicate to the patient? Prognostic Communication: Function - I hope that this is not the case, but I’m worried that this may be as strong as you will feel, and things are likely to get more difficult. 4. If your health situation worsens, what are your most important goals? Goals: have my medical decisions respected, not be a burden     5. What are the biggest fears and worries about the future and your health? Fears/Worries: getting treatments I do not want, loss of mobility, burdening others     6. What abilities are so critical to your life that you cannot imagine living without them? Unacceptable Function: being chronically confused or not being myself, not being able to care for myself, including toileting and feeding     7. What gives you strength as you think about the future with your illness? 8. If you become sicker, how much are you willing to go through for the possibility of gaining more time? Be in the hospital: Yes Have a feeding tube: No   Be in the ICU: No Live in a nursing home: Yes   Be on a ventilator: No Be uncomfortable: No   Be on dialysis: No Undergo aggressive test and/or procedures: No   9. How much does your proxy and family know about your priorities and wishes? Discussion Discussion: extensive discussion with family about goals and wishes  Discussed with granddaughter - VALDEZ Godinez Bhavya Edmondson heard you say that respecting your wishes regarding medical treatment is really important to you.  Keeping that in mind, and what we know about your illness, I recommend that we treat your ailments conservatively. This will help us make sure that your treatment plans reflect what’s important to you. How does this plan sound to you? I will do everything I can to help you through this.   Patient verbalized understanding of the plan     I have spent 42 minutes speaking with my patient on advanced care planning today or during this visit     Advanced directives         Merged with [facility] [address], DO

## 2023-07-11 NOTE — PLAN OF CARE
Problem: MOBILITY - ADULT  Goal: Maintain or return to baseline ADL function  Description: INTERVENTIONS:  -  Assess patient's ability to carry out ADLs; assess patient's baseline for ADL function and identify physical deficits which impact ability to perform ADLs (bathing, care of mouth/teeth, toileting, grooming, dressing, etc.)  - Assess/evaluate cause of self-care deficits   - Assess range of motion  - Assess patient's mobility; develop plan if impaired  - Assess patient's need for assistive devices and provide as appropriate  - Encourage maximum independence but intervene and supervise when necessary  - Involve family in performance of ADLs  - Assess for home care needs following discharge   - Consider OT consult to assist with ADL evaluation and planning for discharge  - Provide patient education as appropriate  7/11/2023 0812 by Leonel Pino RN  Outcome: Progressing  7/11/2023 0812 by Leonel Pino RN  Outcome: Progressing  Goal: Maintains/Returns to pre admission functional level  Description: INTERVENTIONS:  - Perform BMAT or MOVE assessment daily.   - Set and communicate daily mobility goal to care team and patient/family/caregiver. - Collaborate with rehabilitation services on mobility goals if consulted  - Perform Range of Motion 2 times a day. - Reposition patient every 2 hours.   - Dangle patient 2 times a day  - Stand patient 2 times a day  - Ambulate patient 2 times a day  - Out of bed to chair 2 times a day   - Out of bed for meals 2 times a day  - Out of bed for toileting  - Record patient progress and toleration of activity level   7/11/2023 0812 by Leonel Pino RN  Outcome: Progressing  7/11/2023 0812 by Leonel Pino RN  Outcome: Progressing     Problem: PAIN - ADULT  Goal: Verbalizes/displays adequate comfort level or baseline comfort level  Description: Interventions:  - Encourage patient to monitor pain and request assistance  - Assess pain using appropriate pain scale  - Administer analgesics based on type and severity of pain and evaluate response  - Implement non-pharmacological measures as appropriate and evaluate response  - Consider cultural and social influences on pain and pain management  - Notify physician/advanced practitioner if interventions unsuccessful or patient reports new pain  Outcome: Progressing     Problem: INFECTION - ADULT  Goal: Absence or prevention of progression during hospitalization  Description: INTERVENTIONS:  - Assess and monitor for signs and symptoms of infection  - Monitor lab/diagnostic results  - Monitor all insertion sites, i.e. indwelling lines, tubes, and drains  - Monitor endotracheal if appropriate and nasal secretions for changes in amount and color  - [place] appropriate cooling/warming therapies per order  - Administer medications as ordered  - Instruct and encourage patient and family to use good hand hygiene technique  - Identify and instruct in appropriate isolation precautions for identified infection/condition  Outcome: Progressing  Goal: Absence of fever/infection during neutropenic period  Description: INTERVENTIONS:  - Monitor WBC    Outcome: Progressing     Problem: SAFETY ADULT  Goal: Maintain or return to baseline ADL function  Description: INTERVENTIONS:  -  Assess patient's ability to carry out ADLs; assess patient's baseline for ADL function and identify physical deficits which impact ability to perform ADLs (bathing, care of mouth/teeth, toileting, grooming, dressing, etc.)  - Assess/evaluate cause of self-care deficits   - Assess range of motion  - Assess patient's mobility; develop plan if impaired  - Assess patient's need for assistive devices and provide as appropriate  - Encourage maximum independence but intervene and supervise when necessary  - Involve family in performance of ADLs  - Assess for home care needs following discharge   - Consider OT consult to assist with ADL evaluation and planning for discharge  - Provide patient education as appropriate  7/11/2023 5263 by Zaheer Musa RN  Outcome: Progressing  7/11/2023 0812 by Zaheer Musa RN  Outcome: Progressing  Goal: Maintains/Returns to pre admission functional level  Description: INTERVENTIONS:  - Perform BMAT or MOVE assessment daily.   - Set and communicate daily mobility goal to care team and patient/family/caregiver. - Collaborate with rehabilitation services on mobility goals if consulted  - Perform Range of Motion 2 times a day. - Reposition patient every 2 hours.   - Dangle patient 2 times a day  - Stand patient 2 times a day  - Ambulate patient 2 times a day  - Out of bed to chair 2 times a day   - Out of bed for meals 2 times a day  - Out of bed for toileting  - Record patient progress and toleration of activity level   7/11/2023 0812 by Zaheer Musa RN  Outcome: Progressing  7/11/2023 0812 by Zaheer Musa RN  Outcome: Progressing  Goal: Patient will remain free of falls  Description: INTERVENTIONS:  - Educate patient/family on patient safety including physical limitations  - Instruct patient to call for assistance with activity   - Consult OT/PT to assist with strengthening/mobility   - Keep Call bell within reach  - Keep bed low and locked with side rails adjusted as appropriate  - Keep care items and personal belongings within reach  - Initiate and maintain comfort rounds  - Make Fall Risk Sign visible to staff  - Offer Toileting every 2 Hours, in advance of need  - Initiate/Maintain bed/chair alarm  - Obtain necessary fall risk management equipment: yellow socks/bracelet  - Apply yellow socks and bracelet for high fall risk patients  - Consider moving patient to room near nurses station  Outcome: Progressing     Problem: DISCHARGE PLANNING  Goal: Discharge to home or other facility with appropriate resources  Description: INTERVENTIONS:  - Identify barriers to discharge w/patient and caregiver  - Arrange for needed discharge resources and transportation as appropriate  - Identify discharge learning needs (meds, wound care, etc.)  - Arrange for interpretive services to assist at discharge as needed  - Refer to Case Management Department for coordinating discharge planning if the patient needs post-hospital services based on physician/advanced practitioner order or complex needs related to functional status, cognitive ability, or social support system  Outcome: Progressing     Problem: Knowledge Deficit  Goal: Patient/family/caregiver demonstrates understanding of disease process, treatment plan, medications, and discharge instructions  Description: Complete learning assessment and assess knowledge base.   Interventions:  - Provide teaching at level of understanding  - Provide teaching via preferred learning methods  Outcome: Progressing

## 2023-07-11 NOTE — ASSESSMENT & PLAN NOTE
· Patient presenting with symptoms of SOB and lethargic  · Lasix diuresis for now  · Monitor on Tele  · Monitor I/O's  · Diet restriction fluid of 1800 mL per day   · Patient would benefit from diagnostic and therapeutic thoracentesis;  Deferring procedure to be done; opting for more conservative interention  · Hospice evaluation placed

## 2023-07-11 NOTE — ASSESSMENT & PLAN NOTE
· Generalized weakness and malaise for the past 2 days  · Multifactorial in setting of UTI, Volume overload, Age  · UA positive evidenced by large leukocytes, innumerable WBC, Bacteria innumberable  · Await urine culture

## 2023-07-12 PROBLEM — N30.00 ACUTE CYSTITIS: Status: ACTIVE | Noted: 2023-07-12

## 2023-07-12 LAB
ANION GAP SERPL CALCULATED.3IONS-SCNC: 11 MMOL/L
BACTERIA UR CULT: ABNORMAL
BACTERIA UR CULT: ABNORMAL
BUN SERPL-MCNC: 21 MG/DL (ref 5–25)
CALCIUM SERPL-MCNC: 8.5 MG/DL (ref 8.4–10.2)
CHLORIDE SERPL-SCNC: 105 MMOL/L (ref 96–108)
CO2 SERPL-SCNC: 24 MMOL/L (ref 21–32)
CREAT SERPL-MCNC: 0.83 MG/DL (ref 0.6–1.3)
ERYTHROCYTE [DISTWIDTH] IN BLOOD BY AUTOMATED COUNT: 15.9 % (ref 11.6–15.1)
GFR SERPL CREATININE-BSD FRML MDRD: 58 ML/MIN/1.73SQ M
GLUCOSE SERPL-MCNC: 117 MG/DL (ref 65–140)
HCT VFR BLD AUTO: 39.4 % (ref 34.8–46.1)
HGB BLD-MCNC: 12.4 G/DL (ref 11.5–15.4)
MAGNESIUM SERPL-MCNC: 1.9 MG/DL (ref 1.9–2.7)
MCH RBC QN AUTO: 27.4 PG (ref 26.8–34.3)
MCHC RBC AUTO-ENTMCNC: 31.5 G/DL (ref 31.4–37.4)
MCV RBC AUTO: 87 FL (ref 82–98)
PLATELET # BLD AUTO: 455 THOUSANDS/UL (ref 149–390)
PMV BLD AUTO: 10 FL (ref 8.9–12.7)
POTASSIUM SERPL-SCNC: 4.1 MMOL/L (ref 3.5–5.3)
RBC # BLD AUTO: 4.52 MILLION/UL (ref 3.81–5.12)
SODIUM SERPL-SCNC: 140 MMOL/L (ref 135–147)
WBC # BLD AUTO: 10.18 THOUSAND/UL (ref 4.31–10.16)

## 2023-07-12 PROCEDURE — 97163 PT EVAL HIGH COMPLEX 45 MIN: CPT

## 2023-07-12 PROCEDURE — 80048 BASIC METABOLIC PNL TOTAL CA: CPT | Performed by: NURSE PRACTITIONER

## 2023-07-12 PROCEDURE — 85027 COMPLETE CBC AUTOMATED: CPT | Performed by: NURSE PRACTITIONER

## 2023-07-12 PROCEDURE — 83735 ASSAY OF MAGNESIUM: CPT | Performed by: NURSE PRACTITIONER

## 2023-07-12 PROCEDURE — 99232 SBSQ HOSP IP/OBS MODERATE 35: CPT | Performed by: NURSE PRACTITIONER

## 2023-07-12 PROCEDURE — 97167 OT EVAL HIGH COMPLEX 60 MIN: CPT

## 2023-07-12 RX ADMIN — METOROPROLOL TARTRATE 5 MG: 5 INJECTION, SOLUTION INTRAVENOUS at 04:09

## 2023-07-12 RX ADMIN — METOPROLOL TARTRATE 25 MG: 25 TABLET, FILM COATED ORAL at 20:16

## 2023-07-12 RX ADMIN — ENOXAPARIN SODIUM 30 MG: 30 INJECTION SUBCUTANEOUS at 08:50

## 2023-07-12 RX ADMIN — PANTOPRAZOLE SODIUM 40 MG: 40 TABLET, DELAYED RELEASE ORAL at 06:43

## 2023-07-12 RX ADMIN — PROPYLTHIOURACIL 50 MG: 50 TABLET ORAL at 20:16

## 2023-07-12 RX ADMIN — FUROSEMIDE 40 MG: 10 INJECTION, SOLUTION INTRAMUSCULAR; INTRAVENOUS at 16:46

## 2023-07-12 RX ADMIN — FUROSEMIDE 40 MG: 10 INJECTION, SOLUTION INTRAMUSCULAR; INTRAVENOUS at 08:49

## 2023-07-12 RX ADMIN — METOPROLOL TARTRATE 25 MG: 25 TABLET, FILM COATED ORAL at 08:59

## 2023-07-12 RX ADMIN — PROPYLTHIOURACIL 50 MG: 50 TABLET ORAL at 08:53

## 2023-07-12 RX ADMIN — CEFTRIAXONE 1000 MG: 1 INJECTION, SOLUTION INTRAVENOUS at 16:47

## 2023-07-12 RX ADMIN — PROPYLTHIOURACIL 50 MG: 50 TABLET ORAL at 16:47

## 2023-07-12 RX ADMIN — METOROPROLOL TARTRATE 5 MG: 5 INJECTION, SOLUTION INTRAVENOUS at 16:46

## 2023-07-12 NOTE — ASSESSMENT & PLAN NOTE
· Patient presented to the ED with complaints of shortness of breath; with hypoxia  · Requiring 2 L of supplemental oxygen to maintain her oxygen saturation levels. · Chest x-ray (7/10/23): Large right sided pleural effusion. · BNP elevated at 889; no formal diagnosis of CHF  · Echo (7/11/23): Left ventricular cavity size is normal. Wall thickness is normal. Systolic function is mildly reduced (50%).  Wall motion is normal. Diastolic function is normal.  · Granddaughter does not want any invasive procedures/treatments done  · Hospice evaluation was completed

## 2023-07-12 NOTE — PROGRESS NOTES
[address]  Progress Note  Name: Magy Abdalla  MRN: [id number]  Unit/Bed#: -01 I Date of Admission: 7/10/2023   Date of Service: 7/12/2023 I Hospital Day: 2    Assessment/Plan   * Acute respiratory failure with hypoxia [facility])  Assessment & Plan  · Patient presented to the ED with complaints of shortness of breath; with hypoxia  · Requiring 2 L of supplemental oxygen to maintain her oxygen saturation levels. · Chest x-ray (7/10/23): Large right sided pleural effusion. · BNP elevated at 889; no formal diagnosis of CHF  · Echo (7/11/23): Left ventricular cavity size is normal. Wall thickness is normal. Systolic function is mildly reduced (50%). Wall motion is normal. Diastolic function is normal.  · Granddaughter does not want any invasive procedures/treatments done  · Hospice evaluation was completed    Acute cystitis  Assessment & Plan  · Urine culture noted to be positive for gram negative rods, resembling e coli. Ambulatory dysfunction  Assessment & Plan  • Patient lives alone at home; patient/family interested in [facility]  o Specifically, [place] as patients daughter is there  Case management aware  • PT/OT    Generalized weakness  Assessment & Plan  · Generalized weakness and malaise for the past 2 days  · Multifactorial in setting of UTI, Volume overload, Age  · UA positive evidenced by large leukocytes, innumerable WBC, Bacteria innumberable  · Urine culture noted to be positive for gram - rods; resembling e coli    Pleural effusion on right  Assessment & Plan  · Patient presenting with symptoms of SOB and lethargic  · Lasix diuresis for now  · Monitor on Tele  · Monitor I/O's  · Diet restriction fluid of 1800 mL per day   · Patient would benefit from diagnostic and therapeutic thoracentesis;  Deferring procedure to be done; opting for more conservative interention  · Hospice evaluation placed    Essential hypertension  Assessment & Plan  • Continue home meds  • Monitor blood pressure  • PRN BP Meds ordered for SBP > 160    Paroxysmal atrial fibrillation (HCC)  Assessment & Plan  · Parox atrial fibrillation on cardizem and atenolol on no AC due to high risk of falls  · HR 110s on admission, likely worsened by UTI and pleural effusion  • Atenolol discontinued, initiated on Metoprolol in attempt to get jennifer HR control. • Monitor rates/BP  • PRN Lopressor 5 mg IV q5 min for 5 doses for RVR         VTE Pharmacologic Prophylaxis: VTE Score: 6 High Risk (Score >/= 5) - Pharmacological DVT Prophylaxis Ordered: enoxaparin (Lovenox). Sequential Compression Devices Ordered. Patient Centered Rounds: I performed bedside rounds with nursing staff today. Discussions with Specialists or Other Care Team Provider: Case Management    Education and Discussions with Family / Patient: Updated  (granddaughter) via phone. Total Time Spent on Date of Encounter in care of patient: 35 minutes This time was spent on one or more of the following: performing physical exam; counseling and coordination of care; obtaining or reviewing history; documenting in the medical record; reviewing/ordering tests, medications or procedures; communicating with other healthcare professionals and discussing with patient's family/caregivers. Current Length of Stay: 2 day(s)  Current Patient Status: Inpatient   Certification Statement: The patient will continue to require additional inpatient hospital stay due to ongoing treatment in setting of need for PT/OT. Discharge Plan: Anticipate discharge in 24-48 hrs to rehab facility. Code Status: Level 3 - DNAR and DNI    Subjective:   Patient resting in bed, denies complaints of chest pain, shortness of breath, fever, or chills.     Objective:     Vitals:   Temp (24hrs), Av.3 °F (36.3 °C), Min:96.3 °F (35.7 °C), Max:97.9 °F (36.6 °C)    Temp:  [96.3 °F (35.7 °C)-97.9 °F (36.6 °C)] 97.5 °F (36.4 °C)  HR:  [] 106  Resp:  [16] 16  BP: (115-146)/() 115/74  SpO2:  [94 %-99 %] 99 %  Body mass index is 20.12 kg/m². Input and Output Summary (last 24 hours): Intake/Output Summary (Last 24 hours) at 7/12/2023 1123  Last data filed at 7/11/2023 2245  Gross per 24 hour   Intake 220 ml   Output --   Net 220 ml       Physical Exam:   Physical Exam  Vitals and nursing note reviewed. Constitutional:       General: She is not in acute distress. Appearance: She is cachectic. She is ill-appearing. Comments: Frail   Cardiovascular:      Rate and Rhythm: Tachycardia present. Rhythm irregularly irregular. Pulses: Normal pulses. Pulmonary:      Effort: Pulmonary effort is normal.   Abdominal:      Palpations: Abdomen is soft. Musculoskeletal:         General: Normal range of motion. Skin:     General: Skin is warm and dry. Coloration: Skin is pale. Neurological:      Mental Status: She is alert and oriented to person, place, and time. Psychiatric:         Mood and Affect: Mood normal.          Additional Data:     Labs:  Results from last 7 days   Lab Units 07/12/23  0503 07/11/23  0549   WBC Thousand/uL 10.18* 10.16   HEMOGLOBIN g/dL 12.4 13.7   HEMATOCRIT % 39.4 43.3   PLATELETS Thousands/uL 455* 467*   NEUTROS PCT %  --  87*   LYMPHS PCT %  --  7*   MONOS PCT %  --  5   EOS PCT %  --  0     Results from last 7 days   Lab Units 07/12/23  0503 07/11/23  1007 07/11/23  0549   SODIUM mmol/L 140   < > 141   POTASSIUM mmol/L 4.1   < > 5.8*   CHLORIDE mmol/L 105   < > 108   CO2 mmol/L 24   < > 24   BUN mg/dL 21   < > 17   CREATININE mg/dL 0.83   < > 0.77   ANION GAP mmol/L 11   < > 9   CALCIUM mg/dL 8.5   < > 8.3*   ALBUMIN g/dL  --   --  3.2*   TOTAL BILIRUBIN mg/dL  --   --  0.95   ALK PHOS U/L  --   --  143*   ALT U/L  --   --  10   AST U/L  --   --  17   GLUCOSE RANDOM mg/dL 117   < > 103    < > = values in this interval not displayed.      Results from last 7 days   Lab Units 07/11/23  1007   INR  1.40*     Results from last 7 days   Lab Units 07/10/23  1428   POC GLUCOSE mg/dl 110         Results from last 7 days   Lab Units 07/10/23  1442   LACTIC ACID mmol/L 1.3       Lines/Drains:  Invasive Devices     Peripheral Intravenous Line  Duration           Peripheral IV 07/11/23 Distal;Dorsal (posterior); Right Forearm 1 day                  Telemetry:  Telemetry Orders (From admission, onward)             24 Hour Telemetry Monitoring  Continuous x 24 Hours (Telem)        Expiring   Question:  Reason for 24 Hour Telemetry  Answer:  Metabolic/electrolyte disturbance with high probability of dysrhythmia. K level <3 or >6 OR KCL infusion >10mEq/hr                 Telemetry Reviewed: Atrial fibrillation. HR averaging 110-120  Indication for Continued Telemetry Use: Arrthymias requiring medical therapy             Imaging: No pertinent imaging reviewed. Recent Cultures (last 7 days):   Results from last 7 days   Lab Units 07/10/23  1624 07/10/23  1621 07/10/23  1509   BLOOD CULTURE  No Growth at 24 hrs.  No Growth at 24 hrs.  --    URINE CULTURE   --   --  >100,000 cfu/ml Gram Negative Robles resembling Escherichia coli*  30,000-39,000 cfu/ml       Last 24 Hours Medication List:   Current Facility-Administered Medications   Medication Dose Route Frequency Provider Last Rate   • acetaminophen  650 mg Oral Q6H PRN Albino Lawrence DO     • cefTRIAXone  1,000 mg Intravenous Q24H Albino Lawrence DO 1,000 mg (07/11/23 1832)   • dipyridamole  25 mg Oral 4x Daily Albino Lawrence DO     • enoxaparin  30 mg Subcutaneous Daily Isabelle David, CRNP     • furosemide  40 mg Intravenous BID (diuretic) Albino Lawrence DO     • hydrALAZINE  10 mg Intravenous Q6H PRN Albino Lawrence DO     • labetalol  10 mg Intravenous Q6H PRN Albino Lawrence DO     • metoprolol  5 mg Intravenous Q5 Min PRN Albino Lawrence DO     • metoprolol tartrate  25 mg Oral Q12H [address]     • ondansetron  4 mg Intravenous Q6H PRN Albino DO Howard     • pantoprazole  40 mg Oral Early Morning Albino Lawrence DO     • propylthiouracil  50 mg Oral TID Christiano Fuller DO          Today, Patient Was Seen By: JUANITO Watkins    **Please Note: This note may have been constructed using a voice recognition system. **

## 2023-07-12 NOTE — ASSESSMENT & PLAN NOTE
· Parox atrial fibrillation on cardizem and atenolol on no AC due to high risk of falls  · HR 110s on admission, likely worsened by UTI and pleural effusion  • Atenolol discontinued, initiated on Metoprolol in attempt to get jennifer HR control.   • Monitor rates/BP  • PRN Lopressor 5 mg IV q5 min for 5 doses for RVR

## 2023-07-12 NOTE — WOUND OSTOMY CARE
Progress Note - Wound   Matthews Early 80 y.o. female MRN: [id number]  Unit/Bed#: -01 Encounter: [id number]      Assessment:   Patient admitted due to acute respiratory failure with hypoxia. History pf a-fib., GERD, HTN. Wound care consulted for buttock, sacrum, and elbow wounds. Patient agreeable to assessment, alert and oriented x3, incontinent of bowel and bladder, assist of 1 to turn for assessment, wedges in use for turning and repositioning, heels elevated off of mattress, is an assist with care. 1. Pressure injury left elbow, unstageable- POA- Wound is irregular in shape, true depth unknown, approx. 10% non-blanchable pink tissue and 90% yellow adhered tissue, with scant amount of serous drainage noted. Svetlana-wound is dry, intact, blanchable. 2. Pressure injury sacrum, unstageable- POA- Wound is oval in shape, true depth unknown, full-thickness, approx. 10% pink tissue and 905 yellow adhered tissue/slough, with no drainage noted. Svetlana-wound is dry, intact, blanchable hyperpigmented skin. 3. Left ischium- Suspect friction related wound. Wound is oval/irregular in shape, 100% blanchable pink partial thickness tissue, no drainage noted. Svetlana-wound is dry, intact, blanchable pink skin, abraded appearance. 4. Right buttock, hips, right elbow, and heels- skin is dry, intact, blanchable. Educated patient on importance of frequent offloading of pressure via turning, repositioning, and weight-shifting. Verbalized understanding of plan of care. No induration, fluctuance, odor, warmth, redness, or purulence noted to the above noted wounds. New dressings applied. Patient tolerated well, reports mild pain to the wounds. Primary nurse aware of plan of care. See flow sheets for more detailed assessment findings. Will follow along. Skin care plans:  1-Sacrum and L ischium- Cleanse with soap and water. Pat dry.  Apply TRIAD paste to open wounds beds 3 times a day and Hydraguard to intact skin 3 times a day, and PRN with rosibel-care. 2-L elbow- Cleanse wound with NSS, pat dry. Apply Dermagran over wound bed and cover with Allevyn foam dressing. Change every other day and as needed. 3-Elevate heels to offload pressure  4-Ehob cushion when out of bed. 5-Turn/repoisiton q2h for pressure re-distribution on skin. 6-Moisturize skin daily with skin nourishing cream  7-Hydraguard to bilateral heel BID and PRN      Wound 07/10/23 Ischium Left (Active)   Wound Image   07/12/23 0953   Wound Description Ribera 07/12/23 0953   Rosibel-wound Assessment Intact;Dry 07/12/23 0953   Wound Length (cm) 2 cm 07/12/23 0953   Wound Width (cm) 2 cm 07/12/23 0953   Wound Depth (cm) 0.1 cm 07/12/23 0953   Wound Surface Area (cm^2) 4 cm^2 07/12/23 0953   Wound Volume (cm^3) 0.4 cm^3 07/12/23 0953   Calculated Wound Volume (cm^3) 0.4 cm^3 07/12/23 0953   Tunneling 0 cm 07/12/23 0953   Undermining 0 07/12/23 0953   Drainage Amount None 07/12/23 0953   Non-staged Wound Description Partial thickness 07/12/23 0953   Treatments Cleansed;Site care 07/12/23 0953   Dressing Other (Comment) 07/12/23 0953   Wound packed? No 07/12/23 0953   Dressing Changed New 07/12/23 0953       Wound 07/10/23 Pressure Injury Sacrum (Active)   Wound Image   07/12/23 0951   Wound Description Yellow;Slough;Pink 07/12/23 0951   Pressure Injury Stage U 07/12/23 0951   Rosibel-wound Assessment Dry; Intact; Hyperpigmented;Pink 07/12/23 0951   Wound Length (cm) 0.7 cm 07/12/23 0951   Wound Width (cm) 1 cm 07/12/23 0951   Wound Depth (cm) 0.2 cm 07/12/23 0951   Wound Surface Area (cm^2) 0.7 cm^2 07/12/23 0951   Wound Volume (cm^3) 0.14 cm^3 07/12/23 0951   Calculated Wound Volume (cm^3) 0.14 cm^3 07/12/23 0951   Tunneling 0 cm 07/12/23 0951   Undermining 0 07/12/23 0951   Drainage Amount None 07/12/23 0951   Non-staged Wound Description Full thickness 07/12/23 0951   Treatments Cleansed;Irrigation with NSS;Site care 07/12/23 0951   Dressing Other (Comment) 07/12/23 0951   Wound packed? No 07/12/23 0951   Dressing Changed New 07/12/23 0951   Patient Tolerance Tolerated well 07/12/23 0951       Wound 07/10/23 Pressure Injury Elbow Left;Posterior;Proximal (Active)   Wound Image   07/12/23 0947   Wound Description Yellow;Pink 07/12/23 0947   Pressure Injury Stage U 07/12/23 0947   Svetlana-wound Assessment Dry; Intact 07/12/23 0947   Wound Length (cm) 0.7 cm 07/12/23 0947   Wound Width (cm) 1.7 cm 07/12/23 0947   Wound Depth (cm) 0.1 cm 07/12/23 0947   Wound Surface Area (cm^2) 1.19 cm^2 07/12/23 0947   Wound Volume (cm^3) 0.119 cm^3 07/12/23 0947   Calculated Wound Volume (cm^3) 0.12 cm^3 07/12/23 0947   Tunneling 0 cm 07/12/23 0947   Undermining 0 07/12/23 0947   Drainage Amount Scant 07/12/23 0947   Drainage Description Serous 07/12/23 0947   Non-staged Wound Description Not applicable 95/24/79 4459   Treatments Cleansed;Site care 07/12/23 0947   Dressing Dermagran gauze; Foam, Silicon (eg. Allevyn, etc) 07/12/23 0947   Wound packed? No 07/12/23 0947   Dressing Changed Changed 07/12/23 0947   Patient Tolerance Tolerated well 07/12/23 0947   Dressing Status Clean;Dry; Intact 07/12/23 0947     Call or tigertext with any questions  Wound Care will continue to follow    Inder DASILVAN RN CWON  Wound and Ostomy care

## 2023-07-12 NOTE — PLAN OF CARE
Problem: OCCUPATIONAL THERAPY ADULT  Goal: Performs self-care activities at highest level of function for planned discharge setting. See evaluation for individualized goals. Description: Treatment Interventions: ADL retraining, Functional transfer training, UE strengthening/ROM, Endurance training, Cognitive reorientation, Patient/family training, Equipment evaluation/education, Compensatory technique education, Activityengagement          See flowsheet documentation for full assessment, interventions and recommendations. Note: Limitation: Decreased ADL status, Decreased UE strength, Decreased UE ROM, Decreased endurance, Decreased self-care trans, Decreased high-level ADLs  Prognosis: Good  Assessment: Patient is a 80 y.o. female seen for OT evaluation s/p admit to [facility]  on 7/10/2023 w/Acute respiratory failure with hypoxia ([address]). Commorbidities affecting patient's functional performance at time of assessment include: A-fib, HTN, pleural effusion on R, generalized weakness, ambulatory dysfunction, and acute cystitis. Orders placed for OT evaluation and treatment and up and OOB as tolerated . Performed at least two patient identifiers during session including name and wristband. Prior to admission, Patient reporting being independent with ADLs, ambulatory with SPC, and lives alone. Personal factors affecting patient at time of initial evaluation include: limited caregiver support, difficulty performing ADLs and difficulty performing IADLs. Upon evaluation, patient requires minimal  and moderate assist for UB ADLs, moderate and maximal assist for LB ADLs, transfers and functional ambulation in room and bathroom with minimal  assist of 2, with the use of Rolling Walker. Patient is oriented x 4.   Occupational performance is affected by the following deficits: decreased functional reach, limited active ROM, decreased muscle strength, dynamic sit/ stand balance deficit with poor standing tolerance time for self care and functional mobility, decreased activity tolerance and delayed righting and equilibrium reactions. Patient to benefit from continued Occupational Therapy treatment while in the hospital to address deficits as defined above and maximize level of functional independence with ADLs and functional mobility. Occupational Performance areas to address include: grooming , bathing/ shower, dressing, toilet hygiene, transfer to all surfaces and functional ambulation. From OT standpoint, recommendation at time of d/c would be Post acute rehabilitation services.      OT Discharge Recommendation: Post acute rehabilitation services        [facility] OTD, OTR/L

## 2023-07-12 NOTE — DISCHARGE INSTR - OTHER ORDERS
Skin care plans:  1-Sacrum and L ischium- Cleanse with soap and water. Pat dry. Apply TRIAD paste to open wounds beds 3 times a day and Hydraguard to intact skin 3 times a day, and PRN with rosibel-care. 2-L elbow- Cleanse wound with NSS, pat dry. Apply Dermagran over wound bed and cover with Allevyn foam dressing. Change every other day and as needed. 3-Elevate heels to offload pressure  4-waffle cushion when out of bed. 5-Turn/repoisiton every 2 hours for pressure re-distribution on skin.   6-Moisturize skin daily with skin nourishing cream  7-Hydraguard to bilateral heels 2 times a day

## 2023-07-12 NOTE — PHYSICAL THERAPY NOTE
Physical Therapy Evaluation     Patient's Name: Phyllistine Oracio    Admitting Diagnosis  CHF (congestive heart failure) ([address]) [I50.9]  UTI (urinary tract infection) [N39.0]  Pleural effusion [J90]  Failure to thrive in adult [R62.7]    Problem List  Patient Active Problem List   Diagnosis    Acute respiratory failure with hypoxia (HCC)    Paroxysmal atrial fibrillation (HCC)    Essential hypertension    Pleural effusion on right    Generalized weakness    Ambulatory dysfunction    Acute cystitis       Past Medical History  Past Medical History:   Diagnosis Date    Atrial fibrillation ([address])     GERD (gastroesophageal reflux disease)     Heart attack ([address])     unknown when this happened    Hypertension        Past Surgical History  History reviewed. No pertinent surgical history. 07/12/23 1047   PT Last Visit   PT Visit Date 07/12/23   Note Type   Note type Evaluation   Pain Assessment   Pain Assessment Tool 0-10   Pain Score No Pain   Restrictions/Precautions   Weight Bearing Precautions Per Order No   Other Precautions Chair Alarm; Bed Alarm;Telemetry; Fall Risk;Hard of hearing;Visual impairment   Home Living   Type of [address] [place] [address] One level;Stairs to enter with rails; Performs ADLs on one level  (+ GREER)   Home Equipment Cane  (ambulatory with a cane)   Additional Comments noted decline ~1 week ago, couldn't get OOB anymore on her own. granddaughter reports pt hasn't left house in "years"   Prior Function   Level of [place] Independent with ADLs; Independent with functional mobility; Needs assistance with IADLS   Lives With Alone   Receives Help From Family   IADLs Family/Friend/Other provides transportation; Family/Friend/Other provides meals; Family/Friend/Other provides medication management  (granddaughter was assisting with meals)   Falls in the last 6 months 0  (1 near fall)   Vocational Retired   General   Family/Caregiver Present Yes   Cognition   Overall Cognitive Status [facility] Arousal/Participation Alert   Orientation Level Oriented to person;Oriented to place;Oriented to time;Oriented to situation   Following Commands Follows multistep commands with increased time or repetition   Comments pt agreeable to PT evaluation   RLE Assessment   RLE Assessment   (grossly 3+/5)   LLE Assessment   LLE Assessment   (grossly 3+/5)   Vision-Basic Assessment   Current Vision Wears glasses only for reading   Coordination   Movements are Fluid and Coordinated 1   Sensation WFL   Light Touch   RLE Light Touch Grossly intact   LLE Light Touch Grossly intact   Bed Mobility   Supine to Sit 4  Minimal assistance   Additional items Assist x 1;HOB elevated; Bedrails; Increased time required;Verbal cues;LE management   Sit to Supine 4  Minimal assistance   Additional items Assist x 1;HOB elevated; Bedrails; Increased time required;Verbal cues;LE management   Transfers   Sit to Stand 4  Minimal assistance   Additional items Assist x 2;Armrests; Increased time required;Verbal cues; Impulsive   Stand to Sit 4  Minimal assistance   Additional items Assist x 2;Armrests; Increased time required;Verbal cues   Toilet transfer 4  Minimal assistance   Additional items Assist x 2;Armrests; Increased time required;Verbal cues; Commode   Ambulation/Elevation   Gait pattern Decreased foot clearance;Shuffling; Short stride; Step to;Excessively slow   Gait Assistance 4  Minimal assist   Additional items Assist x 2;Verbal cues; Tactile cues   Assistive Device Rolling walker   Distance 2' x 2 (EOB<>BSC)   Balance   Static Sitting Fair   Dynamic Sitting Fair -   Static Standing Poor +   Dynamic Standing Poor   Ambulatory Poor   Endurance Deficit   Endurance Deficit Yes   Endurance Deficit Description pt's HR reading 106bpm upon arrival, reading into 130s during functional mobility. RN aware.  pt denying any lightheadedness/dizziness   Activity Tolerance   Activity Tolerance Patient limited by fatigue   Medical Staff Made Aware Pt seen as a co-eval with OT due to the patient's co-morbidities, clinically unstable presentation, and present impairments which are a regression from the patient's baseline. Nurse Made Aware DAPHNE Sanders   Assessment   Prognosis Good   Problem List Decreased strength;Decreased endurance; Impaired balance;Decreased mobility; Impaired vision; Impaired hearing   Assessment Pt is 80 y.o. female seen for PT evaluation on 7/12/2023 s/p admit to [address] on 7/10/2023 w/ Acute respiratory failure with hypoxia ([address]). PT was consulted to assess pt's functional mobility and d/c needs. Order placed for PT eval and tx. PTA, pt resides alone in [facility] with +GREER, ambulates with cane, +near fall. At time of eval, pt performing bed mobility min A x1, min a x2 for transfers and short gait from B to [facility] with use of RW. Upon evaluation, pt presenting with impaired functional mobility d/t decreased strength, decreased endurance, impaired balance, decreased mobility, impaired vision, impaired hearing and activity intolerance. Pertinent PMHx and current co-morbidities affecting pt's physical performance at time of assessment include: acute respiratory failure with hypoxia, A fib, HTN, pleural effusion, generalized weakness, ambulatory dysfunction, acute cystitis. Personal factors affecting pt at time of eval include: ambulating w/ assistive device, inability to ambulate household distances, inability to navigate level surfaces w/o external assistance and limited home support. The following objective measures performed on IE also reveal limitations: Barthel Index: 30/100, Modified [place]: 4 (moderate/severe disability) and AM-PAC 6-Clicks: 10/42. Pt's clinical presentation is currently unstable/unpredictable seen in pt's presentation of advanced age, abnormal lab value(s), need for input for task focus and mobility technique and ongoing medical assessment.  Overall, pt's rehab potential and prognosis to return to OF is good as impacted by objective findings, warranting pt to receive further skilled PT interventions to address identified impairments, activity limitation(s), and participation restriction(s). Pt to benefit from continued PT tx to address deficits as defined above and maximize level of functional independent mobility. From PT/mobility standpoint, recommendation at time of d/c would be post acute rehabilitation services pending progress in order to facilitate return to PLOF. Barriers to Discharge Inaccessible home environment;Decreased caregiver support   Goals   Patient Goals none expressed   STG Expiration Date 07/22/23   Short Term Goal #1 In 7-10 days: Increase bilateral LE strength 1/2 grade to facilitate independent mobility, Perform all bed mobility tasks with SBA to decrease caregiver burden, Perform all transfers with min A of 1 to improve independence, Ambulate > 50 ft. with least restrictive assistive device with min A of 1 w/o LOB and w/ normalized gait pattern 100% of the time, Increase all balance 1/2 grade to decrease risk for falls and Improve Barthel Index score to 45 or greater to facilitate independence   PT Treatment Day 0   Plan   Treatment/Interventions Functional transfer training;LE strengthening/ROM; Therapeutic exercise; Endurance training;Patient/family training;Equipment eval/education; Bed mobility;Gait training;Spoke to nursing   PT Frequency 3-5x/wk   Recommendation   PT Discharge Recommendation Post acute rehabilitation services   AM-PAC Basic Mobility Inpatient   Turning in Flat Bed Without Bedrails 3   Lying on Back to Sitting on Edge of Flat Bed Without Bedrails 3   Moving Bed to Chair 2   Standing Up From Chair Using Arms 2   Walk in Room 2   Climb 3-5 Stairs With Railing 1   Basic Mobility Inpatient Raw Score 13   Basic Mobility Standardized Score 33.99   Highest Level Of Mobility   -HLM Goal 4: Move to chair/commode   -HLM Achieved 4: Move to chair/commode   Modified Kathie Scale   Modified [place] Scale 4   Barthel Index   Feeding 5   Bathing 0   Grooming Score 0   Dressing Score 5   Bladder Score 5   Bowels Score 5   Toilet Use Score 5   Transfers (Bed/Chair) Score 5   Mobility (Level Surface) Score 0   Stairs Score 0   Barthel Index Score 30         Gary Carey, PT, DPT

## 2023-07-12 NOTE — OCCUPATIONAL THERAPY NOTE
Occupational Therapy Evaluation      Phyllistine Oracio    7/12/2023    Patient Active Problem List   Diagnosis    Acute respiratory failure with hypoxia (HCC)    Paroxysmal atrial fibrillation (HCC)    Essential hypertension    Pleural effusion on right    Generalized weakness    Ambulatory dysfunction    Acute cystitis       Past Medical History:   Diagnosis Date    Atrial fibrillation ([address])     GERD (gastroesophageal reflux disease)     Heart attack ([address])     unknown when this happened    Hypertension         07/12/23 1106   OT Last Visit   OT Visit Date 07/12/23   Note Type   Note type Evaluation   Additional Comments Pt agreeable to OT eval. Upon arrival pt supine in bed with HOB elevated. Pain Assessment   Pain Assessment Tool 0-10   Pain Score No Pain   Restrictions/Precautions   Weight Bearing Precautions Per Order No   Other Precautions Chair Alarm; Bed Alarm;Telemetry; Fall Risk   Home Living   Type of [address] [place] [address] Dr One level;Performs ADLs on one level; Able to live on main level with bedroom/bathroom   [address]  (utilizes [facility] at baseline)   Prior Function   Level of [place] Independent with ADLs; Independent with functional mobility; Needs assistance with IADLS   Lives With Alone   Receives Help From Family   IADLs Family/Friend/Other provides transportation; Family/Friend/Other provides meals; Family/Friend/Other provides medication management  (granddaughter was assisting with meals for the past 2 weeks)   Falls in the last 6 months 0  (1 near fall)   Vocational Retired   Lifestyle   Autonomy Patient reporting being independent with ADLs, ambulatory with SPC and lives alone   Reciprocal Relationships Granddtr   Service to Others Retired   General   Family/Caregiver Present Yes  (Granddtr present throughout session)   ADL   Eating Assistance 5  Supervision/Setup   Grooming Assistance 5  [address] Bathing Assistance 3  Moderate Assistance   UB Dressing Assistance 3  Moderate Assistance   LB Dressing Assistance 2  Maximal Assistance   Toileting Assistance  2  Maximal Assistance   Additional Comments ADL levels based on functional performance   Bed Mobility   Supine to Sit 4  Minimal assistance   Additional items Assist x 1;HOB elevated; Bedrails; Increased time required;Verbal cues;LE management   Sit to Supine 4  Minimal assistance   Additional items Assist x 1;HOB elevated; Bedrails; Increased time required;Verbal cues;LE management   Transfers   Sit to Stand 4  Minimal assistance   Additional items Assist x 2;Bedrails; Increased time required;Verbal cues   Stand to Sit 4  Minimal assistance   Additional items Assist x 2;Bedrails; Increased time required;Verbal cues   Toilet transfer 4  Minimal assistance   Additional items Assist x 2;Armrests; Increased time required;Verbal cues; Commode   Additional Comments Verbal cues provided for safe hand placement during transitional movements   Functional Mobility   Functional Mobility 4  Minimal assistance  (Assist of 2)   Additional Comments Pt ambulated short distance to/from commode with assist of 2. Pt grossly unsteady. Additional items Rolling walker   Balance   Static Sitting Fair   Dynamic Sitting Fair -   Static Standing Poor +   Dynamic Standing Poor   Activity Tolerance   Activity Tolerance Patient limited by fatigue   Medical Staff Made Aware Pt seen as a co-eval with PT Devonte Miller  due to the patient's co-morbidities, clinically unstable presentation, and present impairments which are a regression from the patient's baseline.    RUE Assessment   RUE Assessment WFL  (~100 degree shoulder flex; 3+/5 MMT)   LUE Assessment   LUE Assessment WFL  (~100 degree shoulder flex; 3+/5 MMT)   Hand Function   Gross Motor Coordination Functional   Fine Motor Coordination Functional   Sensation   Light Touch No apparent deficits   Vision-Basic Assessment   Current Vision Wears glasses only for reading   Cognition   Overall Cognitive Status [facility]   Arousal/Participation Alert; Responsive; Cooperative   Attention Within functional limits   Orientation Level Oriented X4   Memory Decreased short term memory;Decreased recall of recent events   Following Commands Follows one step commands without difficulty   Assessment   Limitation Decreased ADL status; Decreased UE strength;Decreased UE ROM; Decreased endurance;Decreased self-care trans;Decreased high-level ADLs   Prognosis Good   Assessment Patient is a 80 y.o. female seen for OT evaluation s/p admit to [facility]  on 7/10/2023 w/Acute respiratory failure with hypoxia ([address] Commorbidities affecting patient's functional performance at time of assessment include: A-fib, HTN, pleural effusion on R, generalized weakness, ambulatory dysfunction, and acute cystitis. Orders placed for OT evaluation and treatment and up and OOB as tolerated . Performed at least two patient identifiers during session including name and wristband. Prior to admission, Patient reporting being independent with ADLs, ambulatory with SPC, and lives alone. Personal factors affecting patient at time of initial evaluation include: limited caregiver support, difficulty performing ADLs and difficulty performing IADLs. Upon evaluation, patient requires minimal  and moderate assist for UB ADLs, moderate and maximal assist for LB ADLs, transfers and functional ambulation in room and bathroom with minimal  assist of 2, with the use of Rolling Walker. Patient is oriented x 4. Occupational performance is affected by the following deficits: decreased functional reach, limited active ROM, decreased muscle strength, dynamic sit/ stand balance deficit with poor standing tolerance time for self care and functional mobility, decreased activity tolerance and delayed righting and equilibrium reactions.  Patient to benefit from continued Occupational Therapy treatment while in the hospital to address deficits as defined above and maximize level of functional independence with ADLs and functional mobility. Occupational Performance areas to address include: grooming , bathing/ shower, dressing, toilet hygiene, transfer to all surfaces and functional ambulation. From OT standpoint, recommendation at time of d/c would be Post acute rehabilitation services. Goals   Patient Goals no goals related to therapy verbalized at this time   Plan   Treatment Interventions ADL retraining;Functional transfer training;UE strengthening/ROM; Endurance training;Cognitive reorientation;Patient/family training;Equipment evaluation/education; Compensatory technique education; Activityengagement   Goal Expiration Date 07/27/23   OT Treatment Day 0   OT Frequency 3-5x/wk   Recommendation   OT Discharge Recommendation Post acute rehabilitation services   AM-PAC Daily Activity Inpatient   Lower Body Dressing 1   Bathing 2   Toileting 1   Upper Body Dressing 3   Grooming 3   Eating 3   Daily Activity Raw Score 13   Daily Activity Standardized Score (Calc for Raw Score >=11) 32.03   AM-PAC Applied Cognition Inpatient   Following a Speech/Presentation 3   Understanding Ordinary Conversation 4   Taking Medications 3   Remembering Where Things Are Placed or Put Away 2   Remembering List of 4-5 Errands 2   Taking Care of Complicated Tasks 2   Applied Cognition Raw Score 16   Applied Cognition Standardized Score 35.03   Modified [place] Scale   Modified Kathie Scale 4   End of Consult   Patient Position at End of Consult Supine;Bed/Chair alarm activated; All needs within reach   Nurse Communication Nurse aware of consult     GOALS:    *ADL transfers with (S) for inc'd independence with ADLs/purposeful tasks    *UB ADL with (S) for inc'd independence with self cares    *LB ADL with CGA using AE prn for inc'd independence with self cares    *Toileting with CGA for clothing management and hygiene for return to PLOF with personal care    *Increase stand tolerance x 3  m for inc'd tolerance with standing purposeful tasks    *Participate in 10m UE therex to increase overall stamina/activity tolerance for purposeful tasks    *Bed mobility- (S) for inc'd independence to manage own comfort and initiate EOB & OOB purposeful tasks    *Patient will verbalize 3 safety awareness/ principles to prevent falls in the home setting. *Patient will increase OOB/sitting tolerance to 2-4 hours per day to increase participation in self-care and leisure tasks with no s/s of exertion. *Patient will engage in ongoing cognitive assessment to assist with safe discharge planning/recommendations.      Ephrichad Peralta, OTD, OTR/L

## 2023-07-12 NOTE — ASSESSMENT & PLAN NOTE
· Generalized weakness and malaise for the past 2 days  · Multifactorial in setting of UTI, Volume overload, Age  · UA positive evidenced by large leukocytes, innumerable WBC, Bacteria innumberable  · Urine culture noted to be positive for gram - rods; resembling e coli

## 2023-07-12 NOTE — PLAN OF CARE
Problem: PHYSICAL THERAPY ADULT  Goal: Performs mobility at highest level of function for planned discharge setting. See evaluation for individualized goals. Description: Treatment/Interventions: Functional transfer training, LE strengthening/ROM, Therapeutic exercise, Endurance training, Patient/family training, Equipment eval/education, Bed mobility, Gait training, Spoke to nursing          See flowsheet documentation for full assessment, interventions and recommendations. 7/12/2023 1147 by Rhys Garcia PT  Note: Prognosis: Good  Problem List: Decreased strength, Decreased endurance, Impaired balance, Decreased mobility, Impaired vision, Impaired hearing  Assessment: Pt is 80 y.o. female seen for PT evaluation on 7/12/2023 s/p admit to [id number] [facility] on 7/10/2023 w/ Acute respiratory failure with hypoxia ([address] PT was consulted to assess pt's functional mobility and d/c needs. Order placed for PT eval and tx. PTA, pt resides alone in [facility] with +GREER, ambulates with cane, +near fall. At time of eval, pt performing bed mobility min A x1, min a x2 for transfers and short gait from [facility] to [facility] with use of RW. Upon evaluation, pt presenting with impaired functional mobility d/t decreased strength, decreased endurance, impaired balance, decreased mobility, impaired vision, impaired hearing and activity intolerance. Pertinent PMHx and current co-morbidities affecting pt's physical performance at time of assessment include: acute respiratory failure with hypoxia, A fib, HTN, pleural effusion, generalized weakness, ambulatory dysfunction, acute cystitis. Personal factors affecting pt at time of eval include: ambulating w/ assistive device, inability to ambulate household distances, inability to navigate level surfaces w/o external assistance and limited home support.  The following objective measures performed on IE also reveal limitations: Barthel Index: 30/100, Modified [place]: 4 (moderate/severe disability) and AM-PAC 6-Clicks: 07/92. Pt's clinical presentation is currently unstable/unpredictable seen in pt's presentation of advanced age, abnormal lab value(s), need for input for task focus and mobility technique and ongoing medical assessment. Overall, pt's rehab potential and prognosis to return to PLOF is good as impacted by objective findings, warranting pt to receive further skilled PT interventions to address identified impairments, activity limitation(s), and participation restriction(s). Pt to benefit from continued PT tx to address deficits as defined above and maximize level of functional independent mobility. From PT/mobility standpoint, recommendation at time of d/c would be post acute rehabilitation services pending progress in order to facilitate return to PLOF. Barriers to Discharge: Inaccessible home environment, Decreased caregiver support     PT Discharge Recommendation: Post acute rehabilitation services    See flowsheet documentation for full assessment. 7/12/2023 1147 by Debbie Cuellar PT  Note: Prognosis: Good  Problem List: Decreased strength, Decreased endurance, Impaired balance, Decreased mobility, Impaired vision, Impaired hearing  Assessment: Pt is 80 y.o. female seen for PT evaluation on 7/12/2023 s/p admit to [id number] [facility] on 7/10/2023 w/ Acute respiratory failure with hypoxia ([address] PT was consulted to assess pt's functional mobility and d/c needs. Order placed for PT eval and tx. PTA, pt resides alone in [facility] with +GREER, ambulates with cane, +near fall. At time of eval, pt performing bed mobility min A x1, min a x2 for transfers and short gait from EOB to [facility] with use of RW. Upon evaluation, pt presenting with impaired functional mobility d/t decreased strength, decreased endurance, impaired balance, decreased mobility, impaired vision, impaired hearing and activity intolerance.  Pertinent PMHx and current co-morbidities affecting pt's physical performance at time of assessment include: acute respiratory failure with hypoxia, A fib, HTN, pleural effusion, generalized weakness, ambulatory dysfunction, acute cystitis. Personal factors affecting pt at time of eval include: ambulating w/ assistive device, inability to ambulate household distances, inability to navigate level surfaces w/o external assistance and limited home support. The following objective measures performed on IE also reveal limitations: Barthel Index: 30/100, Modified [place]: 4 (moderate/severe disability) and AM-PAC 6-Clicks: 18/62. Pt's clinical presentation is currently unstable/unpredictable seen in pt's presentation of advanced age, abnormal lab value(s), need for input for task focus and mobility technique and ongoing medical assessment. Overall, pt's rehab potential and prognosis to return to PLOF is good as impacted by objective findings, warranting pt to receive further skilled PT interventions to address identified impairments, activity limitation(s), and participation restriction(s). Pt to benefit from continued PT tx to address deficits as defined above and maximize level of functional independent mobility. From PT/mobility standpoint, recommendation at time of d/c would be post acute rehabilitation services pending progress in order to facilitate return to PLOF. Barriers to Discharge: Inaccessible home environment, Decreased caregiver support     PT Discharge Recommendation: Post acute rehabilitation services    See flowsheet documentation for full assessment.

## 2023-07-13 LAB
ANION GAP SERPL CALCULATED.3IONS-SCNC: 11 MMOL/L
BUN SERPL-MCNC: 25 MG/DL (ref 5–25)
CALCIUM SERPL-MCNC: 8.2 MG/DL (ref 8.4–10.2)
CHLORIDE SERPL-SCNC: 102 MMOL/L (ref 96–108)
CO2 SERPL-SCNC: 27 MMOL/L (ref 21–32)
CREAT SERPL-MCNC: 0.88 MG/DL (ref 0.6–1.3)
ERYTHROCYTE [DISTWIDTH] IN BLOOD BY AUTOMATED COUNT: 16 % (ref 11.6–15.1)
FLUAV RNA RESP QL NAA+PROBE: NEGATIVE
FLUBV RNA RESP QL NAA+PROBE: NEGATIVE
GFR SERPL CREATININE-BSD FRML MDRD: 54 ML/MIN/1.73SQ M
GLUCOSE SERPL-MCNC: 116 MG/DL (ref 65–140)
HCT VFR BLD AUTO: 38.8 % (ref 34.8–46.1)
HGB BLD-MCNC: 12.4 G/DL (ref 11.5–15.4)
MAGNESIUM SERPL-MCNC: 1.9 MG/DL (ref 1.9–2.7)
MCH RBC QN AUTO: 27.6 PG (ref 26.8–34.3)
MCHC RBC AUTO-ENTMCNC: 32 G/DL (ref 31.4–37.4)
MCV RBC AUTO: 86 FL (ref 82–98)
PLATELET # BLD AUTO: 427 THOUSANDS/UL (ref 149–390)
PMV BLD AUTO: 10.3 FL (ref 8.9–12.7)
POTASSIUM SERPL-SCNC: 3.1 MMOL/L (ref 3.5–5.3)
RBC # BLD AUTO: 4.49 MILLION/UL (ref 3.81–5.12)
RSV RNA RESP QL NAA+PROBE: NEGATIVE
SARS-COV-2 RNA RESP QL NAA+PROBE: NEGATIVE
SODIUM SERPL-SCNC: 140 MMOL/L (ref 135–147)
WBC # BLD AUTO: 9.19 THOUSAND/UL (ref 4.31–10.16)

## 2023-07-13 PROCEDURE — 83735 ASSAY OF MAGNESIUM: CPT | Performed by: NURSE PRACTITIONER

## 2023-07-13 PROCEDURE — 80048 BASIC METABOLIC PNL TOTAL CA: CPT | Performed by: NURSE PRACTITIONER

## 2023-07-13 PROCEDURE — 99232 SBSQ HOSP IP/OBS MODERATE 35: CPT | Performed by: NURSE PRACTITIONER

## 2023-07-13 PROCEDURE — 0241U HB NFCT DS VIR RESP RNA 4 TRGT: CPT | Performed by: NURSE PRACTITIONER

## 2023-07-13 PROCEDURE — 85027 COMPLETE CBC AUTOMATED: CPT | Performed by: NURSE PRACTITIONER

## 2023-07-13 RX ORDER — DIGOXIN 0.25 MG/ML
125 INJECTION INTRAMUSCULAR; INTRAVENOUS EVERY 6 HOURS
Status: DISPENSED | OUTPATIENT
Start: 2023-07-13 | End: 2023-07-14

## 2023-07-13 RX ORDER — POTASSIUM CHLORIDE 20 MEQ/1
40 TABLET, EXTENDED RELEASE ORAL 2 TIMES DAILY
Status: COMPLETED | OUTPATIENT
Start: 2023-07-13 | End: 2023-07-13

## 2023-07-13 RX ADMIN — PROPYLTHIOURACIL 50 MG: 50 TABLET ORAL at 15:12

## 2023-07-13 RX ADMIN — FUROSEMIDE 40 MG: 10 INJECTION, SOLUTION INTRAMUSCULAR; INTRAVENOUS at 08:23

## 2023-07-13 RX ADMIN — ENOXAPARIN SODIUM 30 MG: 30 INJECTION SUBCUTANEOUS at 08:19

## 2023-07-13 RX ADMIN — PROPYLTHIOURACIL 50 MG: 50 TABLET ORAL at 21:02

## 2023-07-13 RX ADMIN — FUROSEMIDE 40 MG: 10 INJECTION, SOLUTION INTRAMUSCULAR; INTRAVENOUS at 15:03

## 2023-07-13 RX ADMIN — DIGOXIN 125 MCG: 0.25 INJECTION INTRAMUSCULAR; INTRAVENOUS at 09:07

## 2023-07-13 RX ADMIN — METOPROLOL TARTRATE 25 MG: 25 TABLET, FILM COATED ORAL at 21:02

## 2023-07-13 RX ADMIN — DIGOXIN 125 MCG: 0.25 INJECTION INTRAMUSCULAR; INTRAVENOUS at 21:02

## 2023-07-13 RX ADMIN — PROPYLTHIOURACIL 50 MG: 50 TABLET ORAL at 08:22

## 2023-07-13 RX ADMIN — CEFTRIAXONE 1000 MG: 1 INJECTION, SOLUTION INTRAVENOUS at 15:07

## 2023-07-13 RX ADMIN — PANTOPRAZOLE SODIUM 40 MG: 40 TABLET, DELAYED RELEASE ORAL at 06:38

## 2023-07-13 RX ADMIN — DIGOXIN 125 MCG: 0.25 INJECTION INTRAMUSCULAR; INTRAVENOUS at 15:04

## 2023-07-13 RX ADMIN — METOPROLOL TARTRATE 25 MG: 25 TABLET, FILM COATED ORAL at 08:22

## 2023-07-13 RX ADMIN — POTASSIUM CHLORIDE 40 MEQ: 1500 TABLET, EXTENDED RELEASE ORAL at 18:02

## 2023-07-13 RX ADMIN — POTASSIUM CHLORIDE 40 MEQ: 1500 TABLET, EXTENDED RELEASE ORAL at 08:29

## 2023-07-13 NOTE — ASSESSMENT & PLAN NOTE
· Generalized weakness and malaise for the past 2 days  · Multifactorial in setting of UTI, Volume overload, Age  · UA positive evidenced by large leukocytes, innumerable WBC, Bacteria innumberable  · Urine culture noted to be positive e coli

## 2023-07-13 NOTE — CASE MANAGEMENT
Case Management Discharge Planning Note    Patient name Conrado Wall  Location [address] [facility] [place] 332/-01 MRN [id number]  : 10/9/1923 Date 2023       Current Admission Date: 7/10/2023  Current Admission Diagnosis:Acute respiratory failure with hypoxia [facility])   Patient Active Problem List    Diagnosis Date Noted   • Acute cystitis 2023   • Acute respiratory failure with hypoxia ([address]) 07/10/2023   • Paroxysmal atrial fibrillation ([address]) 07/10/2023   • Essential hypertension 07/10/2023   • Pleural effusion on right 07/10/2023   • Generalized weakness 07/10/2023   • Ambulatory dysfunction 07/10/2023      LOS (days): 3  Geometric Mean LOS (GMLOS) (days): 3.80  Days to GMLOS:1.1     OBJECTIVE:  Risk of Unplanned Readmission Score: 12.68         Current admission status: Inpatient   Preferred Pharmacy:   [id number], [address]  [address] [place] [address]  Phone: [phone] Fax: [phone]    Primary Care Provider: Steve Hatfield DO    Primary Insurance: MEDICARE  Secondary Insurance: AARP    DISCHARGE DETAILS:    Discharge planning discussed with[de-identified] Patients grand daughter via phone  Freedom of Choice: Yes  Comments - Freedom of Choice: CM discussed freedom of choice as it pertains to discharge planning. Patients granddaughter still agreeable to [facility].   CM contacted family/caregiver?: Yes  Were Treatment Team discharge recommendations reviewed with patient/caregiver?: Yes  Did patient/caregiver verbalize understanding of patient care needs?: Yes  Were patient/caregiver advised of the risks associated with not following Treatment Team discharge recommendations?: Yes         Requested [address]         Is the patient interested in [facility] at discharge?: No    DME Referral Provided  Referral made for DME?: No    Other Referral/Resources/Interventions Provided:  Referral Comments: [address] [place] [address] Phone: [phone] Fax: [phone] RESERVED    Would you like to participate in our [address] service program?  : No - Declined    Treatment Team Recommendation: SNF  Discharge Destination Plan[de-identified] SNF  Transport at Discharge : BLS Ambulance   ETA of Transport (Date): 07/13/23  ETA of Transport (Time): 0100     Transfer Mode: Stretcher  Accompanied by: Alone

## 2023-07-13 NOTE — PROGRESS NOTES
[address]  Progress Note  Name: Olive Buchanan  MRN: [id number]  Unit/Bed#: -01 I Date of Admission: 7/10/2023   Date of Service: 7/13/2023 I Hospital Day: 3    Assessment/Plan   * Acute respiratory failure with hypoxia [facility])  Assessment & Plan  · Patient presented to the ED with complaints of shortness of breath; with hypoxia  · Requiring 2 L of supplemental oxygen to maintain her oxygen saturation levels. · Chest x-ray (7/10/23): Large right sided pleural effusion. · BNP elevated at 889; no formal diagnosis of CHF  · Echo (7/11/23): Left ventricular cavity size is normal. Wall thickness is normal. Systolic function is mildly reduced (50%). Wall motion is normal. Diastolic function is normal.  · Granddaughter does not want any invasive procedures/treatments done  · Hospice evaluation was completed    Acute cystitis  Assessment & Plan  · Urine culture positive for e. Coli  · Receiving IV Ceftriaxone; continue while in hospital; transition to oral on discharge    Ambulatory dysfunction  Assessment & Plan  • Patient lives alone at home; patient/family interested in [facility]  o Specifically, [place] as patients daughter is there  Case management aware  • PT/OT    Generalized weakness  Assessment & Plan  · Generalized weakness and malaise for the past 2 days  · Multifactorial in setting of UTI, Volume overload, Age  · UA positive evidenced by large leukocytes, innumerable WBC, Bacteria innumberable  · Urine culture noted to be positive e coli    Pleural effusion on right  Assessment & Plan  · Patient presenting with symptoms of SOB and lethargic  · Lasix diuresis for now  · Monitor on Tele  · Monitor I/O's  · Diet restriction fluid of 1800 mL per day   · Patient would benefit from diagnostic and therapeutic thoracentesis;  Deferring procedure to be done; opting for more conservative interention  · Hospice evaluation placed    Essential hypertension  Assessment & Plan  • Continue home meds  • Monitor blood pressure  • PRN BP Meds ordered for SBP > 160    Paroxysmal atrial fibrillation (HCC)  Assessment & Plan  · Prior to coming to the hospital; patient maintained on Cardizem/Atenolol for rate control; No AC in setting of age/fall risk. · Rate uncontrolled throughout admission; Atenolol transitioned to Metoprolol without improvement. · Will give patient 4 doses of IV Digoxin today. • Monitor rates/BP  • PRN Lopressor 5 mg IV q5 min for 5 doses for RVR         VTE Pharmacologic Prophylaxis: VTE Score: 6 High Risk (Score >/= 5) - Pharmacological DVT Prophylaxis Ordered: enoxaparin (Lovenox). Sequential Compression Devices Ordered. Patient Centered Rounds: I performed bedside rounds with nursing staff today. Discussions with Specialists or Other Care Team Provider: Case Management    Education and Discussions with Family / Patient: Updated  (granddaughter) at bedside. Total Time Spent on Date of Encounter in care of patient: 35 minutes This time was spent on one or more of the following: performing physical exam; counseling and coordination of care; obtaining or reviewing history; documenting in the medical record; reviewing/ordering tests, medications or procedures; communicating with other healthcare professionals and discussing with patient's family/caregivers. Current Length of Stay: 3 day(s)  Current Patient Status: Inpatient   Certification Statement: The patient will continue to require additional inpatient hospital stay due to ongoing treatment in setting of tachycardia; digoxin therapy ordered  Discharge Plan: Anticipate discharge tomorrow to rehab facility. Code Status: Level 3 - DNAR and DNI    Subjective:   Patient resting in bed, denies complaints of chest pain, shortness of breath, fever, or chills. No complaints. No overnight events.     Objective:     Vitals:   Temp (24hrs), Av.5 °F (36.4 °C), Min:97.1 °F (36.2 °C), Max:97.8 °F (36.6 °C)    Temp: [97.1 °F (36.2 °C)-97.8 °F (36.6 °C)] 97.1 °F (36.2 °C)  HR:  [] 85  Resp:  [16-20] 16  BP: (100-118)/(58-78) 111/73  SpO2:  [95 %-99 %] 99 %  Body mass index is 20.12 kg/m². Input and Output Summary (last 24 hours): Intake/Output Summary (Last 24 hours) at 7/13/2023 0919  Last data filed at 7/13/2023 0034  Gross per 24 hour   Intake 265 ml   Output 742 ml   Net -477 ml       Physical Exam:   Physical Exam  Vitals and nursing note reviewed. Constitutional:       General: She is not in acute distress. Appearance: She is cachectic. She is ill-appearing (chronic). Comments: Frail   Cardiovascular:      Rate and Rhythm: Tachycardia present. Rhythm irregularly irregular. Pulses: Normal pulses. Pulmonary:      Effort: Pulmonary effort is normal.      Breath sounds: Decreased breath sounds present. Comments: 98% RA  Abdominal:      General: Bowel sounds are normal.      Palpations: Abdomen is soft. Musculoskeletal:         General: Normal range of motion. Skin:     General: Skin is warm and dry. Neurological:      Mental Status: She is alert. Mental status is at baseline. Psychiatric:         Mood and Affect: Mood normal.          Additional Data:     Labs:  Results from last 7 days   Lab Units 07/13/23  0514 07/12/23  0503 07/11/23  0549   WBC Thousand/uL 9.19   < > 10.16   HEMOGLOBIN g/dL 12.4   < > 13.7   HEMATOCRIT % 38.8   < > 43.3   PLATELETS Thousands/uL 427*   < > 467*   NEUTROS PCT %  --   --  87*   LYMPHS PCT %  --   --  7*   MONOS PCT %  --   --  5   EOS PCT %  --   --  0    < > = values in this interval not displayed.      Results from last 7 days   Lab Units 07/13/23  0514 07/11/23  1007 07/11/23  0549   SODIUM mmol/L 140   < > 141   POTASSIUM mmol/L 3.1*   < > 5.8*   CHLORIDE mmol/L 102   < > 108   CO2 mmol/L 27   < > 24   BUN mg/dL 25   < > 17   CREATININE mg/dL 0.88   < > 0.77   ANION GAP mmol/L 11   < > 9   CALCIUM mg/dL 8.2*   < > 8.3*   ALBUMIN g/dL  --   -- 3.2*   TOTAL BILIRUBIN mg/dL  --   --  0.95   ALK PHOS U/L  --   --  143*   ALT U/L  --   --  10   AST U/L  --   --  17   GLUCOSE RANDOM mg/dL 116   < > 103    < > = values in this interval not displayed. Results from last 7 days   Lab Units 07/11/23  1007   INR  1.40*     Results from last 7 days   Lab Units 07/10/23  1428   POC GLUCOSE mg/dl 110         Results from last 7 days   Lab Units 07/10/23  1442   LACTIC ACID mmol/L 1.3       Lines/Drains:  Invasive Devices     Peripheral Intravenous Line  Duration           Peripheral IV 07/11/23 Distal;Dorsal (posterior); Right Forearm 2 days                  Telemetry:  Telemetry Orders (From admission, onward)             24 Hour Telemetry Monitoring  Continuous x 24 Hours (Telem)        Question:  Reason for 24 Hour Telemetry  Answer:  Metabolic/electrolyte disturbance with high probability of dysrhythmia. K level <3 or >6 OR KCL infusion >10mEq/hr                 Telemetry Reviewed: Atrial fibrillation. HR averaging 110-120  Indication for Continued Telemetry Use: Arrthymias requiring medical therapy             Imaging: No pertinent imaging reviewed. Recent Cultures (last 7 days):   Results from last 7 days   Lab Units 07/10/23  1624 07/10/23  1621 07/10/23  1509   BLOOD CULTURE  No Growth at 48 hrs. No Growth at 48 hrs.   --    URINE CULTURE   --   --  >100,000 cfu/ml Escherichia coli*  30,000-39,000 cfu/ml       Last 24 Hours Medication List:   Current Facility-Administered Medications   Medication Dose Route Frequency Provider Last Rate   • acetaminophen  650 mg Oral Q6H PRN Albino Lawrence DO     • cefTRIAXone  1,000 mg Intravenous Q24H Albino Lawrence DO 1,000 mg (07/12/23 1647)   • digoxin  125 mcg Intravenous Q6H JUANITO Taylor     • enoxaparin  30 mg Subcutaneous Daily JUANITO Galo     • furosemide  40 mg Intravenous BID (diuretic) Albino Lawrence DO     • hydrALAZINE  10 mg Intravenous Q6H PRN Albino Lawrence DO     • labetalol  10 mg Intravenous Q6H PRN Albino Lawrence, DO     • metoprolol  5 mg Intravenous Q5 Min PRN Albino Lawrence, DO     • metoprolol tartrate  25 mg Oral Q12H [address] JUANITO Deras     • ondansetron  4 mg Intravenous Q6H PRN Albino Lawrence, DO     • pantoprazole  40 mg Oral Early Morning Albino Lawrence DO     • potassium chloride  40 mEq Oral BID JUANITO Mancilla     • propylthiouracil  50 mg Oral TID Luanne Gomez DO          Today, Patient Was Seen By: JUANITO Mancilla    **Please Note: This note may have been constructed using a voice recognition system. **

## 2023-07-13 NOTE — ASSESSMENT & PLAN NOTE
· Urine culture positive for e.  Coli  · Receiving IV Ceftriaxone; continue while in hospital; transition to oral on discharge

## 2023-07-14 VITALS
BODY MASS INDEX: 20.24 KG/M2 | SYSTOLIC BLOOD PRESSURE: 111 MMHG | DIASTOLIC BLOOD PRESSURE: 96 MMHG | RESPIRATION RATE: 16 BRPM | TEMPERATURE: 97.7 F | HEART RATE: 102 BPM | HEIGHT: 62 IN | WEIGHT: 110 LBS | OXYGEN SATURATION: 93 %

## 2023-07-14 LAB
ANION GAP SERPL CALCULATED.3IONS-SCNC: 9 MMOL/L
BUN SERPL-MCNC: 24 MG/DL (ref 5–25)
CALCIUM SERPL-MCNC: 8.9 MG/DL (ref 8.4–10.2)
CHLORIDE SERPL-SCNC: 99 MMOL/L (ref 96–108)
CO2 SERPL-SCNC: 30 MMOL/L (ref 21–32)
CREAT SERPL-MCNC: 0.94 MG/DL (ref 0.6–1.3)
GFR SERPL CREATININE-BSD FRML MDRD: 50 ML/MIN/1.73SQ M
GLUCOSE SERPL-MCNC: 128 MG/DL (ref 65–140)
POTASSIUM SERPL-SCNC: 4.5 MMOL/L (ref 3.5–5.3)
SODIUM SERPL-SCNC: 138 MMOL/L (ref 135–147)

## 2023-07-14 PROCEDURE — 80048 BASIC METABOLIC PNL TOTAL CA: CPT | Performed by: NURSE PRACTITIONER

## 2023-07-14 PROCEDURE — 99239 HOSP IP/OBS DSCHRG MGMT >30: CPT | Performed by: NURSE PRACTITIONER

## 2023-07-14 RX ORDER — CEPHALEXIN 500 MG/1
500 CAPSULE ORAL EVERY 8 HOURS SCHEDULED
Refills: 0
Start: 2023-07-14 | End: 2023-07-17

## 2023-07-14 RX ORDER — FUROSEMIDE 20 MG/1
20 TABLET ORAL DAILY
Refills: 0
Start: 2023-07-14

## 2023-07-14 RX ADMIN — ENOXAPARIN SODIUM 30 MG: 30 INJECTION SUBCUTANEOUS at 09:13

## 2023-07-14 RX ADMIN — PANTOPRAZOLE SODIUM 40 MG: 40 TABLET, DELAYED RELEASE ORAL at 05:49

## 2023-07-14 NOTE — CASE MANAGEMENT
Case Management Discharge Planning Note    Patient name Shi De La Garza  Location [address] [facility] 332/-01 MRN [id number]  : 10/9/1923 Date 2023       Current Admission Date: 7/10/2023  Current Admission Diagnosis:Acute respiratory failure with hypoxia [facility])   Patient Active Problem List    Diagnosis Date Noted   • Acute cystitis 2023   • Acute respiratory failure with hypoxia ([address]) 07/10/2023   • Paroxysmal atrial fibrillation ([address]) 07/10/2023   • Essential hypertension 07/10/2023   • Pleural effusion on right 07/10/2023   • Generalized weakness 07/10/2023   • Ambulatory dysfunction 07/10/2023      LOS (days): 4  Geometric Mean LOS (GMLOS) (days): 3.80  Days to GMLOS:0     OBJECTIVE:  Risk of Unplanned Readmission Score: 10.49         Current admission status: Inpatient   Preferred Pharmacy:   [facility] [address] [address]  [address] [place] [address]  Phone: [phone] Fax: [phone]    Primary Care Provider: Margarito Huggins DO    Primary Insurance: MEDICARE  Secondary Insurance: AARP    DISCHARGE DETAILS:  Additional Comments: [facility] claimed the ride for Shi De La Garza in unit/room  bed -01, and will arrive on 2023 at 12:30pm EDT. Contact them at [phone]. Ride details here: [email or web address]. Granddaughter naga was informed via phone. Head,  normocephalic,  atraumatic,  Face,  Face within normal limits,  Ears,  External ears within normal limits,  Nose/Nasopharynx,  External nose  normal appearance,  nares patent,  no nasal discharge,  Mouth and Throat,  Oral cavity appearance normal,  Breath odor normal,  Lips,  Appearance normal

## 2023-07-14 NOTE — ASSESSMENT & PLAN NOTE
· Generalized weakness and malaise x 2 days prior to admission  · Multifactorial in setting of UTI, Volume overload, Age  · UA positive evidenced by large leukocytes, innumerable WBC, Bacteria innumberable  · Urine culture noted to be positive e coli  · PT/OT recommending STR  · Will complete 7 days abx for UTI

## 2023-07-14 NOTE — DISCHARGE SUMMARY
[address]  Discharge- Qamar Horse 10/9/1923, 80 y.o. female MRN: [id number]  Unit/Bed#: MS [facility] Encounter: [id number]  Primary Care Provider: Jessica Ortiz DO   Date and time admitted to hospital: 7/10/2023  2:22 PM    * Acute respiratory failure with hypoxia [facility])  Assessment & Plan  · Patient presented to the ED with complaints of shortness of breath; with hypoxia  · Requiring 2 L of supplemental oxygen to maintain her oxygen saturation levels. · Chest x-ray (7/10/23): Large right sided pleural effusion. · BNP elevated at 889; no formal diagnosis of CHF  · Echo (7/11/23): Left ventricular cavity size is normal. Wall thickness is normal. Systolic function is mildly reduced (50%). Wall motion is normal. Diastolic function is normal.  · Granddaughter does not want any invasive procedures/treatments done; opting for medical management with Lasix  · PT/OT  · Recommending STR  · Hospice evaluation was completed    Acute cystitis  Assessment & Plan  · Urine culture positive for e. Coli  · Patient improved with IV Ceftriaxone; had 3 days in the hospital; will give additional Keflex on discharge to complete 7 days of therapy.     Ambulatory dysfunction  Assessment & Plan  • Patient lives alone at home; patient/family interested in [facility]  o Specifically, [place] as patients daughter is there  Case management aware  • PT/OT  o STR recommendation    Generalized weakness  Assessment & Plan  · Generalized weakness and malaise x 2 days prior to admission  · Multifactorial in setting of UTI, Volume overload, Age  · UA positive evidenced by large leukocytes, innumerable WBC, Bacteria innumberable  · Urine culture noted to be positive e coli  · PT/OT recommending STR  · Will complete 7 days abx for UTI    Pleural effusion on right  Assessment & Plan  · Patient presenting with symptoms of SOB and lethargic  · Noted to have a large pleural effusion on the right  · Will continue with PO Lasix on discharge, 20 mg daily. · Patient would benefit from diagnostic and therapeutic thoracentesis; Deferring procedure to be done; opting for more conservative interention  · Hospice evaluation placed    Essential hypertension  Assessment & Plan  • Well controlled    Paroxysmal atrial fibrillation ([address])  Assessment & Plan  · Prior to coming to the hospital; patient maintained on Cardizem/Atenolol for rate control; No AC in setting of age/fall risk. · Rate uncontrolled throughout admission; Atenolol transitioned to Metoprolol without improvement. • Heart rate is better controlled with the doses of Digoxin; will continue with metoprolol    Medical Problems     Resolved Problems  Date Reviewed: 7/14/2023   None       Discharging Physician / Practitioner: Saúl Vanegas  PCP: Marco Antonio Arias DO  Admission Date:   Admission Orders (From admission, onward)     Ordered        07/10/23 1723  INPATIENT ADMISSION  Once                      Discharge Date: 07/14/23    Consultations During Hospital Stay:  IP CONSULT TO HOSPICE    Procedures Performed:   · None    Significant Findings / Test Results:   XR chest 1 view portable   ED Interpretation by Eveline Wagner PA-C (07/10 5809)   Right pleural effusion, atelectasis vs infiltrate RLL        Final Result by Rocio Wiley MD (07/11 4925)      Large right-sided pleural effusion. Workstation performed: [id number]               Incidental Findings:   · None    Test Results Pending at Discharge (will require follow up):    · None     Outpatient Tests Requested:  · None    Complications:  None    Reason for Admission: Ambulatory Dysfunction; Acute respiratory failure with hypoxia in setting of large right pleural effusion    Hospital Course:   Kait Tatum is a 80 y.o. female patient with past medical history of a-fib, hypertension who originally presented to the hospital on 7/10/2023 due to shortness of breath, ambulatory dysfunction for approx 2 days prior to coming into the hospital.  On arrival to the ER, patient required 2-3 L of supplemental oxygen to maintain her oxygen saturation levels above 90%. She had a chest x-ray completed which indicated a large right pleural effusion, however, family opted for conservative treatment, defered on the thoracentesis and agreed to IV Lasix therapy. During her admission, she also was found to have a UTI with e coli and was treated with IV Ceftriaxone, and transitioned to PO Keflex. Hospice was consulted and evaluated the patient. PT/OT also evaluated the patient and recommended STR. Patient is stable for transfer to [address] [place] [address] for rehab. Please see above list of diagnoses and related plan for additional information. Condition at Discharge: stable    Discharge Day Visit / Exam:   Subjective:  Patient resting in bed with no complaints. No overnight events. Vitals: Blood Pressure: 119/71 (07/14/23 0734)  Pulse: (!) 121 (07/14/23 0734)  Temperature: (!) 97.2 °F (36.2 °C) (07/14/23 0734)  Temp Source: Oral (07/10/23 1424)  Respirations: 16 (07/14/23 0243)  Height: 5' 2" (157.5 cm) (07/11/23 1045)  Weight - Scale: 49.9 kg (110 lb) (07/11/23 1045)  SpO2: 97 % (07/14/23 0734)     Exam:   Physical Exam  Vitals and nursing note reviewed. Constitutional:       General: She is not in acute distress. Appearance: She is cachectic. She is ill-appearing (chronic). Cardiovascular:      Rate and Rhythm: Tachycardia present. Rhythm irregular. Pulses: Normal pulses. Pulmonary:      Effort: Pulmonary effort is normal.   Abdominal:      Palpations: Abdomen is soft. Musculoskeletal:         General: Normal range of motion. Right lower leg: No edema. Left lower leg: No edema. Skin:     General: Skin is warm and dry. Coloration: Skin is pale. Neurological:      Mental Status: She is alert. Mental status is at baseline.    Psychiatric:         Mood and Affect: Mood normal. Discussion with Family: Will update granddaughter with  time. Leisa Sawyer Discharge instructions/Information to patient and family:   See after visit summary for information provided to patient and family. Provisions for Follow-Up Care:  See after visit summary for information related to follow-up care and any pertinent home health orders. Disposition:   Other [facility] Readmission: None     Discharge Statement:  I spent 35 minutes discharging the patient. This time was spent on the day of discharge. I had direct contact with the patient on the day of discharge. Greater than 50% of the total time was spent examining patient, answering all patient questions, arranging and discussing plan of care with patient as well as directly providing post-discharge instructions. Additional time then spent on discharge activities. Discharge Medications:  See after visit summary for reconciled discharge medications provided to patient and/or family.       **Please Note: This note may have been constructed using a voice recognition system**

## 2023-07-14 NOTE — ASSESSMENT & PLAN NOTE
• Patient lives alone at home; patient/family interested in SNF  o Specifically, [place] as patients daughter is there  Case management aware  • PT/OT  o STR recommendation

## 2023-07-14 NOTE — ASSESSMENT & PLAN NOTE
· Patient presented to the ED with complaints of shortness of breath; with hypoxia  · Requiring 2 L of supplemental oxygen to maintain her oxygen saturation levels. · Chest x-ray (7/10/23): Large right sided pleural effusion. · BNP elevated at 889; no formal diagnosis of CHF  · Echo (7/11/23): Left ventricular cavity size is normal. Wall thickness is normal. Systolic function is mildly reduced (50%).  Wall motion is normal. Diastolic function is normal.  · Granddaughter does not want any invasive procedures/treatments done; opting for medical management with Lasix  · PT/OT  · Recommending STR  · Hospice evaluation was completed

## 2023-07-14 NOTE — ASSESSMENT & PLAN NOTE
· Urine culture positive for e. Coli  · Patient improved with IV Ceftriaxone; had 3 days in the hospital; will give additional Keflex on discharge to complete 7 days of therapy.

## 2023-07-14 NOTE — ASSESSMENT & PLAN NOTE
· Prior to coming to the hospital; patient maintained on Cardizem/Atenolol for rate control; No AC in setting of age/fall risk. · Rate uncontrolled throughout admission; Atenolol transitioned to Metoprolol without improvement.   • Heart rate is better controlled with the doses of Digoxin; will continue with metoprolol

## 2023-07-14 NOTE — PLAN OF CARE
Problem: MOBILITY - ADULT  Goal: Maintain or return to baseline ADL function  Description: INTERVENTIONS:  -  Assess patient's ability to carry out ADLs; assess patient's baseline for ADL function and identify physical deficits which impact ability to perform ADLs (bathing, care of mouth/teeth, toileting, grooming, dressing, etc.)  - Assess/evaluate cause of self-care deficits   - Assess range of motion  - Assess patient's mobility; develop plan if impaired  - Assess patient's need for assistive devices and provide as appropriate  - Encourage maximum independence but intervene and supervise when necessary  - Involve family in performance of ADLs  - Assess for home care needs following discharge   - Consider OT consult to assist with ADL evaluation and planning for discharge  - Provide patient education as appropriate  Outcome: Progressing  Goal: Maintains/Returns to pre admission functional level  Description: INTERVENTIONS:  - Perform BMAT or MOVE assessment daily.   - Set and communicate daily mobility goal to care team and patient/family/caregiver. - Collaborate with rehabilitation services on mobility goals if consulted  - Perform Range of Motion 2 times a day. - Reposition patient every 2 hours.   - Dangle patient   - Stand patient   - Ambulate patient   - Out of bed to chair   - Out of bed for meals   - Out of bed for toileting  - Record patient progress and toleration of activity level   Outcome: Progressing     Problem: INFECTION - ADULT  Goal: Absence or prevention of progression during hospitalization  Description: INTERVENTIONS:  - Assess and monitor for signs and symptoms of infection  - Monitor lab/diagnostic results  - Monitor all insertion sites, i.e. indwelling lines, tubes, and drains  - Monitor endotracheal if appropriate and nasal secretions for changes in amount and color  - [place] appropriate cooling/warming therapies per order  - Administer medications as ordered  - Instruct and encourage patient and family to use good hand hygiene technique  - Identify and instruct in appropriate isolation precautions for identified infection/condition  Outcome: Progressing     Problem: SAFETY ADULT  Goal: Maintain or return to baseline ADL function  Description: INTERVENTIONS:  -  Assess patient's ability to carry out ADLs; assess patient's baseline for ADL function and identify physical deficits which impact ability to perform ADLs (bathing, care of mouth/teeth, toileting, grooming, dressing, etc.)  - Assess/evaluate cause of self-care deficits   - Assess range of motion  - Assess patient's mobility; develop plan if impaired  - Assess patient's need for assistive devices and provide as appropriate  - Encourage maximum independence but intervene and supervise when necessary  - Involve family in performance of ADLs  - Assess for home care needs following discharge   - Consider OT consult to assist with ADL evaluation and planning for discharge  - Provide patient education as appropriate  Outcome: Progressing  Goal: Maintains/Returns to pre admission functional level  Description: INTERVENTIONS:  - Perform BMAT or MOVE assessment daily.   - Set and communicate daily mobility goal to care team and patient/family/caregiver. - Collaborate with rehabilitation services on mobility goals if consulted  - Perform Range of Motion 2 times a day. - Reposition patient every 2 hours.   - Dangle patient   - Stand patient   - Ambulate patient   - Out of bed to chair   - Out of bed for meals   - Out of bed for toileting  - Record patient progress and toleration of activity level   Outcome: Progressing

## 2023-07-14 NOTE — ASSESSMENT & PLAN NOTE
· Patient presenting with symptoms of SOB and lethargic  · Noted to have a large pleural effusion on the right  · Will continue with PO Lasix on discharge, 20 mg daily. · Patient would benefit from diagnostic and therapeutic thoracentesis;  Deferring procedure to be done; opting for more conservative interention  · Hospice evaluation placed

## 2023-07-15 LAB
BACTERIA BLD CULT: NORMAL
BACTERIA BLD CULT: NORMAL